# Patient Record
Sex: FEMALE | Race: WHITE | NOT HISPANIC OR LATINO | Employment: FULL TIME | URBAN - METROPOLITAN AREA
[De-identification: names, ages, dates, MRNs, and addresses within clinical notes are randomized per-mention and may not be internally consistent; named-entity substitution may affect disease eponyms.]

---

## 2020-07-10 ENCOUNTER — HOSPITAL ENCOUNTER (OUTPATIENT)
Facility: MEDICAL CENTER | Age: 45
End: 2020-07-10
Attending: PATHOLOGY
Payer: COMMERCIAL

## 2020-07-10 LAB — COVID ORDER STATUS COVID19: NORMAL

## 2020-07-10 PROCEDURE — U0003 INFECTIOUS AGENT DETECTION BY NUCLEIC ACID (DNA OR RNA); SEVERE ACUTE RESPIRATORY SYNDROME CORONAVIRUS 2 (SARS-COV-2) (CORONAVIRUS DISEASE [COVID-19]), AMPLIFIED PROBE TECHNIQUE, MAKING USE OF HIGH THROUGHPUT TECHNOLOGIES AS DESCRIBED BY CMS-2020-01-R: HCPCS

## 2020-07-12 LAB
SARS-COV-2 RNA RESP QL NAA+PROBE: NOTDETECTED
SPECIMEN SOURCE: NORMAL

## 2020-09-26 ENCOUNTER — OFFICE VISIT (OUTPATIENT)
Dept: URGENT CARE | Facility: CLINIC | Age: 45
End: 2020-09-26
Payer: COMMERCIAL

## 2020-09-26 VITALS
DIASTOLIC BLOOD PRESSURE: 60 MMHG | TEMPERATURE: 98.4 F | RESPIRATION RATE: 12 BRPM | HEIGHT: 65 IN | OXYGEN SATURATION: 97 % | SYSTOLIC BLOOD PRESSURE: 80 MMHG | BODY MASS INDEX: 18.33 KG/M2 | WEIGHT: 110 LBS | HEART RATE: 94 BPM

## 2020-09-26 DIAGNOSIS — A08.4 VIRAL GASTROENTERITIS: ICD-10-CM

## 2020-09-26 PROCEDURE — 99214 OFFICE O/P EST MOD 30 MIN: CPT | Performed by: FAMILY MEDICINE

## 2020-09-26 RX ORDER — PROMETHAZINE HYDROCHLORIDE 25 MG/1
25 TABLET ORAL EVERY 6 HOURS PRN
Qty: 20 TAB | Refills: 0 | Status: SHIPPED | OUTPATIENT
Start: 2020-09-26 | End: 2022-10-06

## 2020-09-26 ASSESSMENT — ENCOUNTER SYMPTOMS
FEVER: 0
HEADACHES: 1
NAUSEA: 1
VOMITING: 1
COUGH: 0
CHILLS: 0
SHORTNESS OF BREATH: 0
SORE THROAT: 0
DIARRHEA: 1
DIZZINESS: 1

## 2020-09-26 NOTE — PROGRESS NOTES
"Subjective:     Karis Gerardo is a 45 y.o. female who presents for Nausea (x1 week) and Diarrhea (x1 day)    HPI  Pt presents for evaluation of a new problem   Pt with nausea and vomiting x 1 week   Having diarrhea today   Having some abdominal soreness and back soreness from emesis  No constant abdominal pain  No bloody diarrhea or bloody emesis  Able to keep down fluids sometimes and small bites of white rice or bread  Emesis is several times a day and not improving  Has a slight headache today  No sore throat, fever, cough    Review of Systems   Constitutional: Negative for chills and fever.   HENT: Negative for congestion and sore throat.    Respiratory: Negative for cough and shortness of breath.    Cardiovascular: Negative for chest pain.   Gastrointestinal: Positive for diarrhea, nausea and vomiting.   Skin: Negative for rash.   Neurological: Positive for dizziness and headaches.       PMH: No history of recurrent emesis or stomach problems  MEDS:   Current Outpatient Medications:   •  Sertraline HCl (ZOLOFT PO), Take 45 mg by mouth every day., Disp: , Rfl:   ALLERGIES:   Allergies   Allergen Reactions   • Pcn [Penicillins]    • Sulfa Drugs      SURGHX:   Past Surgical History:   Procedure Laterality Date   • NASAL RECONSTRUCTION       SOCHX:  reports that she has never smoked. She does not have any smokeless tobacco history on file. She reports current alcohol use. She reports that she does not use drugs.  FH: Family history was reviewed, not contributing to acute illness     Objective:   BP (!) 80/60   Pulse 94   Temp 36.9 °C (98.4 °F) (Temporal)   Resp 12   Ht 1.651 m (5' 5\")   Wt 49.9 kg (110 lb)   LMP 09/10/2020 (Exact Date)   SpO2 97%   Breastfeeding No   BMI 18.30 kg/m²     Physical Exam  Constitutional:       General: She is not in acute distress.     Appearance: She is well-developed. She is not diaphoretic.   HENT:      Head: Normocephalic and atraumatic.   Pulmonary:      Effort: " Pulmonary effort is normal.   Abdominal:      General: Abdomen is flat. Bowel sounds are normal. There is no distension.      Palpations: Abdomen is soft.      Tenderness: There is no right CVA tenderness, left CVA tenderness, guarding or rebound.      Comments: +Slight tenderness in epigastric area    Skin:     General: Skin is warm and dry.      Findings: No erythema.   Neurological:      Mental Status: She is alert and oriented to person, place, and time.      Sensory: No sensory deficit.   Psychiatric:         Behavior: Behavior normal.         Thought Content: Thought content normal.         Judgment: Judgment normal.       Assessment/Plan:   Assessment    1. Viral gastroenteritis  - promethazine (PHENERGAN) 25 MG Tab; Take 1 Tab by mouth every 6 hours as needed for Nausea/Vomiting.  Dispense: 20 Tab; Refill: 0    Patient with viral gastroenteritis.  Low suspicion for pancreatitis, however did discuss possibility of alternative diagnoses.  Patient's blood pressure is a little low, however only getting slightly lightheaded when standing quickly.  Had a long discussion about risks and benefits of outpatient treatment versus being seen in the ER.  Do feel she would benefit from IV fluids.  Patient would like to try treatment on outpatient basis and will start Phenergan tabs.  Reviewed diet advancement and expected recovery course.  If patient is not able to start eating and drinking quite a bit more or if she becomes more dizzy, she will go to the emergency room tonight or tomorrow for consideration of IV fluids and for further work-up.  Patient is agreeable to the plan and will follow-up in the urgent care on an as-needed basis.

## 2020-10-21 ENCOUNTER — HOSPITAL ENCOUNTER (EMERGENCY)
Facility: MEDICAL CENTER | Age: 45
End: 2020-10-21
Attending: EMERGENCY MEDICINE
Payer: COMMERCIAL

## 2020-10-21 VITALS
WEIGHT: 104.6 LBS | OXYGEN SATURATION: 99 % | HEART RATE: 80 BPM | TEMPERATURE: 97.3 F | RESPIRATION RATE: 16 BRPM | HEIGHT: 65 IN | DIASTOLIC BLOOD PRESSURE: 83 MMHG | SYSTOLIC BLOOD PRESSURE: 122 MMHG | BODY MASS INDEX: 17.43 KG/M2

## 2020-10-21 DIAGNOSIS — R11.2 NON-INTRACTABLE VOMITING WITH NAUSEA, UNSPECIFIED VOMITING TYPE: ICD-10-CM

## 2020-10-21 DIAGNOSIS — R10.13 EPIGASTRIC ABDOMINAL PAIN: ICD-10-CM

## 2020-10-21 LAB
ALBUMIN SERPL BCP-MCNC: 4.7 G/DL (ref 3.2–4.9)
ALBUMIN/GLOB SERPL: 1.5 G/DL
ALP SERPL-CCNC: 62 U/L (ref 30–99)
ALT SERPL-CCNC: 12 U/L (ref 2–50)
ANION GAP SERPL CALC-SCNC: 12 MMOL/L (ref 7–16)
APPEARANCE UR: CLEAR
AST SERPL-CCNC: 18 U/L (ref 12–45)
BACTERIA #/AREA URNS HPF: ABNORMAL /HPF
BASOPHILS # BLD AUTO: 0.7 % (ref 0–1.8)
BASOPHILS # BLD: 0.05 K/UL (ref 0–0.12)
BILIRUB SERPL-MCNC: 0.9 MG/DL (ref 0.1–1.5)
BILIRUB UR QL STRIP.AUTO: NEGATIVE
BUN SERPL-MCNC: 7 MG/DL (ref 8–22)
CALCIUM SERPL-MCNC: 9.4 MG/DL (ref 8.4–10.2)
CHLORIDE SERPL-SCNC: 105 MMOL/L (ref 96–112)
CO2 SERPL-SCNC: 26 MMOL/L (ref 20–33)
COLOR UR: YELLOW
CREAT SERPL-MCNC: 0.75 MG/DL (ref 0.5–1.4)
EOSINOPHIL # BLD AUTO: 0.16 K/UL (ref 0–0.51)
EOSINOPHIL NFR BLD: 2.4 % (ref 0–6.9)
EPI CELLS #/AREA URNS HPF: ABNORMAL /HPF
ERYTHROCYTE [DISTWIDTH] IN BLOOD BY AUTOMATED COUNT: 39.1 FL (ref 35.9–50)
GLOBULIN SER CALC-MCNC: 3.1 G/DL (ref 1.9–3.5)
GLUCOSE SERPL-MCNC: 86 MG/DL (ref 65–99)
GLUCOSE UR STRIP.AUTO-MCNC: NEGATIVE MG/DL
HCG SERPL QL: NEGATIVE
HCT VFR BLD AUTO: 46.3 % (ref 37–47)
HGB BLD-MCNC: 15.6 G/DL (ref 12–16)
IMM GRANULOCYTES # BLD AUTO: 0.03 K/UL (ref 0–0.11)
IMM GRANULOCYTES NFR BLD AUTO: 0.4 % (ref 0–0.9)
KETONES UR STRIP.AUTO-MCNC: NEGATIVE MG/DL
LEUKOCYTE ESTERASE UR QL STRIP.AUTO: ABNORMAL
LIPASE SERPL-CCNC: 59 U/L (ref 7–58)
LYMPHOCYTES # BLD AUTO: 1.55 K/UL (ref 1–4.8)
LYMPHOCYTES NFR BLD: 23.2 % (ref 22–41)
MCH RBC QN AUTO: 30.9 PG (ref 27–33)
MCHC RBC AUTO-ENTMCNC: 33.7 G/DL (ref 33.6–35)
MCV RBC AUTO: 91.7 FL (ref 81.4–97.8)
MICRO URNS: ABNORMAL
MONOCYTES # BLD AUTO: 0.61 K/UL (ref 0–0.85)
MONOCYTES NFR BLD AUTO: 9.1 % (ref 0–13.4)
NEUTROPHILS # BLD AUTO: 4.27 K/UL (ref 2–7.15)
NEUTROPHILS NFR BLD: 64.2 % (ref 44–72)
NITRITE UR QL STRIP.AUTO: NEGATIVE
NRBC # BLD AUTO: 0 K/UL
NRBC BLD-RTO: 0 /100 WBC
PH UR STRIP.AUTO: 6.5 [PH] (ref 5–8)
PLATELET # BLD AUTO: 250 K/UL (ref 164–446)
PMV BLD AUTO: 9.3 FL (ref 9–12.9)
POTASSIUM SERPL-SCNC: 3.6 MMOL/L (ref 3.6–5.5)
PROT SERPL-MCNC: 7.8 G/DL (ref 6–8.2)
PROT UR QL STRIP: NEGATIVE MG/DL
RBC # BLD AUTO: 5.05 M/UL (ref 4.2–5.4)
RBC # URNS HPF: ABNORMAL /HPF
RBC UR QL AUTO: ABNORMAL
SODIUM SERPL-SCNC: 143 MMOL/L (ref 135–145)
SP GR UR STRIP.AUTO: <=1.005
WBC # BLD AUTO: 6.7 K/UL (ref 4.8–10.8)
WBC #/AREA URNS HPF: ABNORMAL /HPF

## 2020-10-21 PROCEDURE — 85025 COMPLETE CBC W/AUTO DIFF WBC: CPT

## 2020-10-21 PROCEDURE — 96374 THER/PROPH/DIAG INJ IV PUSH: CPT

## 2020-10-21 PROCEDURE — 700105 HCHG RX REV CODE 258: Performed by: EMERGENCY MEDICINE

## 2020-10-21 PROCEDURE — 84703 CHORIONIC GONADOTROPIN ASSAY: CPT

## 2020-10-21 PROCEDURE — 81001 URINALYSIS AUTO W/SCOPE: CPT

## 2020-10-21 PROCEDURE — 80053 COMPREHEN METABOLIC PANEL: CPT

## 2020-10-21 PROCEDURE — 99284 EMERGENCY DEPT VISIT MOD MDM: CPT

## 2020-10-21 PROCEDURE — 83690 ASSAY OF LIPASE: CPT

## 2020-10-21 PROCEDURE — 700111 HCHG RX REV CODE 636 W/ 250 OVERRIDE (IP): Performed by: EMERGENCY MEDICINE

## 2020-10-21 PROCEDURE — 96375 TX/PRO/DX INJ NEW DRUG ADDON: CPT

## 2020-10-21 RX ORDER — MECLIZINE HYDROCHLORIDE 25 MG/1
25 TABLET ORAL EVERY 6 HOURS PRN
Qty: 20 TAB | Refills: 0 | Status: SHIPPED | OUTPATIENT
Start: 2020-10-21 | End: 2022-10-06

## 2020-10-21 RX ORDER — ONDANSETRON 8 MG/1
8 TABLET, ORALLY DISINTEGRATING ORAL EVERY 12 HOURS PRN
Status: SHIPPED | COMMUNITY
End: 2022-10-06

## 2020-10-21 RX ORDER — OMEPRAZOLE 40 MG/1
40 CAPSULE, DELAYED RELEASE ORAL DAILY
Qty: 30 CAP | Refills: 0 | Status: SHIPPED | OUTPATIENT
Start: 2020-10-21 | End: 2022-10-06

## 2020-10-21 RX ORDER — PROCHLORPERAZINE EDISYLATE 5 MG/ML
10 INJECTION INTRAMUSCULAR; INTRAVENOUS ONCE
Status: COMPLETED | OUTPATIENT
Start: 2020-10-21 | End: 2020-10-21

## 2020-10-21 RX ORDER — LORAZEPAM 2 MG/ML
1 INJECTION INTRAMUSCULAR ONCE
Status: COMPLETED | OUTPATIENT
Start: 2020-10-21 | End: 2020-10-21

## 2020-10-21 RX ORDER — SODIUM CHLORIDE, SODIUM LACTATE, POTASSIUM CHLORIDE, CALCIUM CHLORIDE 600; 310; 30; 20 MG/100ML; MG/100ML; MG/100ML; MG/100ML
1000 INJECTION, SOLUTION INTRAVENOUS ONCE
Status: COMPLETED | OUTPATIENT
Start: 2020-10-21 | End: 2020-10-21

## 2020-10-21 RX ORDER — LORAZEPAM 0.5 MG/1
0.5 TABLET ORAL EVERY 8 HOURS PRN
Qty: 15 TAB | Refills: 0 | Status: SHIPPED | OUTPATIENT
Start: 2020-10-21 | End: 2020-10-26

## 2020-10-21 RX ORDER — OMEPRAZOLE 40 MG/1
40 CAPSULE, DELAYED RELEASE ORAL DAILY
Qty: 30 CAP | Refills: 0 | Status: SHIPPED | OUTPATIENT
Start: 2020-10-21 | End: 2020-10-21 | Stop reason: SDUPTHER

## 2020-10-21 RX ADMIN — LORAZEPAM 1 MG: 2 INJECTION INTRAMUSCULAR; INTRAVENOUS at 09:03

## 2020-10-21 RX ADMIN — PROCHLORPERAZINE EDISYLATE 10 MG: 5 INJECTION INTRAMUSCULAR; INTRAVENOUS at 06:56

## 2020-10-21 RX ADMIN — SODIUM CHLORIDE, POTASSIUM CHLORIDE, SODIUM LACTATE AND CALCIUM CHLORIDE 1000 ML: 600; 310; 30; 20 INJECTION, SOLUTION INTRAVENOUS at 06:56

## 2020-10-21 NOTE — DISCHARGE INSTRUCTIONS
Gastritis    Avoid all ibuprofen and naproxen, aspirin and alcohol.  Take Prilosec 40 mg a day for 4 weeks.  Return for worsening pain, bleeding or pain and fever.  Followup with your doctor or GI if not better in 3-4 days.   Take maalox and Tylenol if needed until acid blocker takes effect.    You had a borderline or high normal blood pressure reading today.  This does not necessarily mean you have hypertension.  Please followup with your/a primary physician for comprehensive blood pressure evaluation and yearly fasting cholesterol assessment.  BP Readings from Last 3 Encounters:   10/21/20 103/65   09/26/20 (!) 80/60   09/20/16 100/65         You have gastritis. Gastritis is an irritation of the stomach. This is often caused by medications, but can be from anything that bothers the stomach.   Other stomach irritants are:  Alcohol.  Caffeine.  Nicotine.  Spicy or acid foods.     Medications for pain and arthritis. Aspirin and other anti-inflammatory medicines such as ibuprofen (Advil), naproxen (Aleve), and ketoprofen (Orudis) can be highly irritating.  Emotional distress.     Symptoms of gastritis may include:  Abdominal pain. Indigestion. Nausea and or vomiting. Bleeding.      Some patients with chronic gastritis and ulcers have been infected by a germ. They may need special testing. Medications which kill germs can be used to cure this condition.    Treatment includes avoiding the substances mentioned above that are known to cause stomach trouble.    Medications used to treat gastritis can include:  Antacids.  Medicines to control vomiting.   Acid blocking medicines (Axid, Pepcid, Prevacid, Prilosec, Tagamet, Zantac).     Symptoms of gastritis usually improve within 2-3 days of starting treatment. Call your caregiver if you are not better in a few days.    SEEK MEDICAL CARE IF YOU:    Have increased stomach pain or chest pain.  Vomit blood.  Faint or feel light headed. Can not keep fluids down.  Pass bloody or  black stools.  Develop severe back pain.     Document Released: 12/18/2006  Document Re-Released: 06/30/2008  [employer]® Patient Information ©2008 [employer], [employer].

## 2020-10-21 NOTE — ED TRIAGE NOTES
Pt reports epigastric abdominal pain x 1 month. States pain was worse last night and was unable to keep anything down. Pt has been seen by GI - has had barium x-ray and ultrasound done, and nothing was found. Unable to get in for endoscopy for 2 months.

## 2020-10-21 NOTE — ED PROVIDER NOTES
ED Provider Note    CHIEF COMPLAINT  Chief Complaint   Patient presents with   • Abdominal Pain       HPI  Karis Gerardo is a 45 y.o. female who presents with uncontrolled vomiting yesterday associated with mild epigastric pain.  Patient's had 6 weeks of nausea and vomiting variable from day to day associated with epigastric pain.  She has chronic baseline diarrhea.  She has had labs supposedly at [facility] and another lab at [facility].  She has had an ultrasound at [facility] that was normal.  She is seeing Dr. Elias AGUILAR and has endoscopy pending in December.  Denies any proton pump inhibitor or sucralfate use.  She has been treated with an antiemetic.  Ondansetron has not been of benefit to her.  No history of gastritis or ulcer.  Denies NSAID use.  She does drink 1-1-1/2 alcoholic beverages daily but stopped 6 weeks ago when her symptoms developed.  No abdominal surgeries diverticulitis pregnancy ovarian cyst endometriosis or pelvic infections.  She has had an upper GI that may have shown esophagitis.    REVIEW OF SYSTEMS  Pertinent positives include: Epigastric abdominal pain, nausea, vomiting, chronic diarrhea.  Pertinent negatives include: Fever, cough, coronavirus exposure, dysuria, urgency, frequency, hematuria, kidney stones.  10+ systems reviewed and negative.      PAST MEDICAL HISTORY  Denies    FAMILY HISTORY  Family History   Problem Relation Age of Onset   • Hypertension Father    • Hyperlipidemia Sister        SOCIAL HISTORY  Social History     Tobacco Use   • Smoking status: Never Smoker   Substance Use Topics   • Alcohol use: Yes   • Drug use: No     Social History     Substance and Sexual Activity   Drug Use No       SURGICAL HISTORY  Past Surgical History:   Procedure Laterality Date   • NASAL RECONSTRUCTION         CURRENT MEDICATIONS    Current Outpatient Medications   Medication Sig Dispense Refill   • promethazine (PHENERGAN) 25 MG Tab Take 1 Tab by mouth every 6 hours as needed  "for Nausea/Vomiting. 20 Tab 0   • Sertraline HCl (ZOLOFT PO) Take 45 mg by mouth every day.         ALLERGIES  Allergies   Allergen Reactions   • Pcn [Penicillins]    • Sulfa Drugs        PHYSICAL EXAM  VITAL SIGNS: /81   Pulse 84   Temp 36.3 °C (97.3 °F) (Temporal)   Resp 19   Ht 1.651 m (5' 5\")   Wt 47.4 kg (104 lb 9.6 oz)   SpO2 92%   BMI 17.41 kg/m²   Reviewed and afebrile  Constitutional: Well developed, Well nourished, well-appearing thin.  HENT: Normocephalic, atraumatic, bilateral external ears normal, Wearing mask.   Eyes: PERRLA, conjunctiva pink, no scleral icterus.   Cardiovascular: Regular S1-S2 without murmur, rub, gallop.  No dependent edema or calf tenderness.  Respiratory: No rales, rhonchi, wheeze or cough.  Gastrointestinal: Soft, minimal epigastric tenderness, nondistended, no organomegaly.  No masses, bowel sounds normal  Skin: No erythema, no rash.   Genitourinary:  No costovertebral angle tenderness.   Neurologic: Alert & oriented x 3, cranial nerves 2-12 intact by passive exam.  No focal deficit noted.  Psychiatric: Affect normal, Judgment normal, Mood normal.     DIFFERENTIAL DIAGNOSIS:  Gastritis, peptic ulcer disease, doubt perforation pancreatitis a calculus cholecystitis doubt splenic infarct doubt colitis.    RADIOLOGY/PROCEDURES  Ultrasound report reviewed and normal of right upper quadrant    LABORATORY:  Results for orders placed or performed during the hospital encounter of 10/21/20   CBC WITH DIFFERENTIAL   Result Value Ref Range    WBC 6.7 4.8 - 10.8 K/uL    RBC 5.05 4.20 - 5.40 M/uL    Hemoglobin 15.6 12.0 - 16.0 g/dL    Hematocrit 46.3 37.0 - 47.0 %    MCV 91.7 81.4 - 97.8 fL    MCH 30.9 27.0 - 33.0 pg    MCHC 33.7 33.6 - 35.0 g/dL    RDW 39.1 35.9 - 50.0 fL    Platelet Count 250 164 - 446 K/uL    MPV 9.3 9.0 - 12.9 fL    Neutrophils-Polys 64.20 44.00 - 72.00 %    Lymphocytes 23.20 22.00 - 41.00 %    Monocytes 9.10 0.00 - 13.40 %    Eosinophils 2.40 0.00 - 6.90 %    " Basophils 0.70 0.00 - 1.80 %    Immature Granulocytes 0.40 0.00 - 0.90 %    Nucleated RBC 0.00 /100 WBC    Neutrophils (Absolute) 4.27 2.00 - 7.15 K/uL    Lymphs (Absolute) 1.55 1.00 - 4.80 K/uL    Monos (Absolute) 0.61 0.00 - 0.85 K/uL    Eos (Absolute) 0.16 0.00 - 0.51 K/uL    Baso (Absolute) 0.05 0.00 - 0.12 K/uL    Immature Granulocytes (abs) 0.03 0.00 - 0.11 K/uL    NRBC (Absolute) 0.00 K/uL   Comp Metabolic Panel   Result Value Ref Range    Sodium 143 135 - 145 mmol/L    Potassium 3.6 3.6 - 5.5 mmol/L    Chloride 105 96 - 112 mmol/L    Co2 26 20 - 33 mmol/L    Anion Gap 12.0 7.0 - 16.0    Glucose 86 65 - 99 mg/dL    Bun 7 (L) 8 - 22 mg/dL    Creatinine 0.75 0.50 - 1.40 mg/dL    Calcium 9.4 8.4 - 10.2 mg/dL    AST(SGOT) 18 12 - 45 U/L    ALT(SGPT) 12 2 - 50 U/L    Alkaline Phosphatase 62 30 - 99 U/L    Total Bilirubin 0.9 0.1 - 1.5 mg/dL    Albumin 4.7 3.2 - 4.9 g/dL    Total Protein 7.8 6.0 - 8.2 g/dL    Globulin 3.1 1.9 - 3.5 g/dL    A-G Ratio 1.5 g/dL   LIPASE   Result Value Ref Range    Lipase 59 (H) 7 - 58 U/L   URINALYSIS    Specimen: Blood   Result Value Ref Range    Color Yellow     Character Clear     Specific Gravity <=1.005 <1.035    Ph 6.5 5.0 - 8.0    Glucose Negative Negative mg/dL    Ketones Negative Negative mg/dL    Protein Negative Negative mg/dL    Bilirubin Negative Negative    Nitrite Negative Negative    Leukocyte Esterase Trace (A) Negative    Occult Blood Trace (A) Negative    Micro Urine Req Microscopic    HCG QUAL SERUM   Result Value Ref Range    Beta-Hcg Qualitative Serum Negative Negative   URINE MICROSCOPIC (W/UA)   Result Value Ref Range    WBC 5-10 (A) /hpf    RBC 2-5 (A) /hpf    Bacteria Few (A) None /hpf    Epithelial Cells Moderate (A) Few /hpf       INTERVENTIONS: Indication IV fluids abdominal pain and uncontrolled vomiting  Medications   lactated ringers infusion (BOLUS) (0 mL Intravenous Stopped 10/21/20 3686)   prochlorperazine (COMPAZINE) injection 10 mg (10 mg  Intravenous Given 10/21/20 0656)   LORazepam (ATIVAN) injection 1 mg (1 mg Intravenous Given 10/21/20 0903)     Response: Tolerated p.o. trial.  Improvement in nausea with lorazepam.  Improved hydration.    COURSE & MEDICAL DECISION MAKING  Appearing patient presents with epigastric abdominal pain and vomiting for 6 weeks.  Its likely the pain is dyspeptic either gastritis or peptic ulcer disease although there is no evidence of perforation.  Ultrasound was negative for cholelithiasis and cholecystitis.  There is no pancreatitis.  Symptoms are unlike colitis or splenic infarct.  For some reason the patient has not yet been placed on proton pump inhibitors..    This patient has borderline or elevated blood pressure as recorded above and was instructed to followup with primary physician for comprehensive blood pressure evaluation and yearly fasting cholesterol assessment according to to CMS protocol.    PLAN:  New Prescriptions    LORAZEPAM (ATIVAN) 0.5 MG TAB    Take 1 Tab by mouth every 8 hours as needed for Anxiety (or insomnia) for up to 5 days.    MECLIZINE (ANTIVERT) 25 MG TAB    Take 1 Tab by mouth every 6 hours as needed for Dizziness, Nausea/Vomiting or Vertigo.    OMEPRAZOLE (PRILOSEC) 40 MG DELAYED-RELEASE CAPSULE    Take 1 Cap by mouth every day.     Avoid alcohol and NSAIDs  Tylenol and Maalox for pain  Gastritis handout given  Return for severe pain, uncontrolled vomiting, GI bleed, dizziness, ill appearance    Leon Griffin M.D.  [address] [place] [id number]  [phone]    Schedule an appointment as soon as possible for a visit   or endoscopy as planned      CONDITION: Stable, improved.    FINAL IMPRESSION  1. Epigastric abdominal pain    2. Non-intractable vomiting with nausea, unspecified vomiting type          Electronically signed by: Kurtis Gr M.D., 10/21/2020 6:32 AM

## 2020-11-10 ENCOUNTER — HOSPITAL ENCOUNTER (OUTPATIENT)
Dept: RADIOLOGY | Facility: MEDICAL CENTER | Age: 45
End: 2020-11-10
Attending: INTERNAL MEDICINE
Payer: COMMERCIAL

## 2020-11-10 DIAGNOSIS — R11.2 NAUSEA AND VOMITING, INTRACTABILITY OF VOMITING NOT SPECIFIED, UNSPECIFIED VOMITING TYPE: ICD-10-CM

## 2020-11-10 DIAGNOSIS — R10.13 ABDOMINAL PAIN, EPIGASTRIC: ICD-10-CM

## 2020-11-10 DIAGNOSIS — R63.4 LOSS OF WEIGHT: ICD-10-CM

## 2020-11-10 PROCEDURE — 74018 RADEX ABDOMEN 1 VIEW: CPT

## 2022-03-22 ENCOUNTER — HOSPITAL ENCOUNTER (OUTPATIENT)
Dept: CARDIOLOGY | Facility: MEDICAL CENTER | Age: 47
End: 2022-03-22
Attending: FAMILY MEDICINE
Payer: COMMERCIAL

## 2022-03-22 DIAGNOSIS — R07.2 PRECORDIAL PAIN: ICD-10-CM

## 2022-03-22 LAB — LV EJECT FRACT  99904: 65

## 2022-03-22 PROCEDURE — 93018 CV STRESS TEST I&R ONLY: CPT | Performed by: INTERNAL MEDICINE

## 2022-03-22 PROCEDURE — 93350 STRESS TTE ONLY: CPT | Mod: 26 | Performed by: INTERNAL MEDICINE

## 2022-03-22 PROCEDURE — 93017 CV STRESS TEST TRACING ONLY: CPT

## 2022-09-27 ENCOUNTER — APPOINTMENT (OUTPATIENT)
Dept: RADIOLOGY | Facility: MEDICAL CENTER | Age: 47
End: 2022-09-27
Attending: EMERGENCY MEDICINE
Payer: COMMERCIAL

## 2022-09-27 ENCOUNTER — APPOINTMENT (OUTPATIENT)
Dept: URGENT CARE | Facility: CLINIC | Age: 47
End: 2022-09-27
Payer: COMMERCIAL

## 2022-09-27 ENCOUNTER — HOSPITAL ENCOUNTER (EMERGENCY)
Facility: MEDICAL CENTER | Age: 47
End: 2022-09-27
Attending: EMERGENCY MEDICINE
Payer: COMMERCIAL

## 2022-09-27 VITALS
OXYGEN SATURATION: 95 % | HEIGHT: 65 IN | TEMPERATURE: 98.5 F | DIASTOLIC BLOOD PRESSURE: 82 MMHG | HEART RATE: 83 BPM | BODY MASS INDEX: 18.22 KG/M2 | RESPIRATION RATE: 16 BRPM | SYSTOLIC BLOOD PRESSURE: 123 MMHG | WEIGHT: 109.35 LBS

## 2022-09-27 DIAGNOSIS — I82.492 ACUTE DEEP VEIN THROMBOSIS (DVT) OF OTHER SPECIFIED VEIN OF LEFT LOWER EXTREMITY (HCC): ICD-10-CM

## 2022-09-27 LAB
ALBUMIN SERPL BCP-MCNC: 4.2 G/DL (ref 3.2–4.9)
ALP SERPL-CCNC: 56 U/L (ref 30–99)
ALT SERPL-CCNC: 15 U/L (ref 2–50)
ANION GAP SERPL CALC-SCNC: 11 MMOL/L (ref 7–16)
APTT PPP: 23.4 SEC (ref 24.7–36)
AST SERPL-CCNC: 22 U/L (ref 12–45)
BASOPHILS # BLD AUTO: 0.5 % (ref 0–1.8)
BASOPHILS # BLD: 0.05 K/UL (ref 0–0.12)
BILIRUB CONJ SERPL-MCNC: <0.2 MG/DL (ref 0.1–0.5)
BILIRUB INDIRECT SERPL-MCNC: NORMAL MG/DL (ref 0–1)
BILIRUB SERPL-MCNC: 0.6 MG/DL (ref 0.1–1.5)
BUN SERPL-MCNC: 12 MG/DL (ref 8–22)
CALCIUM SERPL-MCNC: 9.2 MG/DL (ref 8.4–10.2)
CHLORIDE SERPL-SCNC: 103 MMOL/L (ref 96–112)
CO2 SERPL-SCNC: 23 MMOL/L (ref 20–33)
CREAT SERPL-MCNC: 0.67 MG/DL (ref 0.5–1.4)
EOSINOPHIL # BLD AUTO: 0.44 K/UL (ref 0–0.51)
EOSINOPHIL NFR BLD: 4.7 % (ref 0–6.9)
ERYTHROCYTE [DISTWIDTH] IN BLOOD BY AUTOMATED COUNT: 41.6 FL (ref 35.9–50)
GFR SERPLBLD CREATININE-BSD FMLA CKD-EPI: 110 ML/MIN/1.73 M 2
GLUCOSE SERPL-MCNC: 93 MG/DL (ref 65–99)
HCG SERPL QL: NEGATIVE
HCT VFR BLD AUTO: 44.9 % (ref 37–47)
HGB BLD-MCNC: 15 G/DL (ref 12–16)
IMM GRANULOCYTES # BLD AUTO: 0.02 K/UL (ref 0–0.11)
IMM GRANULOCYTES NFR BLD AUTO: 0.2 % (ref 0–0.9)
INR PPP: 0.96 (ref 0.87–1.13)
LYMPHOCYTES # BLD AUTO: 1.99 K/UL (ref 1–4.8)
LYMPHOCYTES NFR BLD: 21.1 % (ref 22–41)
MCH RBC QN AUTO: 31 PG (ref 27–33)
MCHC RBC AUTO-ENTMCNC: 33.4 G/DL (ref 33.6–35)
MCV RBC AUTO: 92.8 FL (ref 81.4–97.8)
MONOCYTES # BLD AUTO: 0.64 K/UL (ref 0–0.85)
MONOCYTES NFR BLD AUTO: 6.8 % (ref 0–13.4)
NEUTROPHILS # BLD AUTO: 6.3 K/UL (ref 2–7.15)
NEUTROPHILS NFR BLD: 66.7 % (ref 44–72)
NRBC # BLD AUTO: 0 K/UL
NRBC BLD-RTO: 0 /100 WBC
PLATELET # BLD AUTO: 274 K/UL (ref 164–446)
PMV BLD AUTO: 9.1 FL (ref 9–12.9)
POTASSIUM SERPL-SCNC: 4.2 MMOL/L (ref 3.6–5.5)
PROT SERPL-MCNC: 7.5 G/DL (ref 6–8.2)
PROTHROMBIN TIME: 12.4 SEC (ref 12–14.6)
RBC # BLD AUTO: 4.84 M/UL (ref 4.2–5.4)
SODIUM SERPL-SCNC: 137 MMOL/L (ref 135–145)
TSH SERPL DL<=0.005 MIU/L-ACNC: 1.57 UIU/ML (ref 0.38–5.33)
WBC # BLD AUTO: 9.4 K/UL (ref 4.8–10.8)

## 2022-09-27 PROCEDURE — 36415 COLL VENOUS BLD VENIPUNCTURE: CPT

## 2022-09-27 PROCEDURE — 85025 COMPLETE CBC W/AUTO DIFF WBC: CPT

## 2022-09-27 PROCEDURE — 70496 CT ANGIOGRAPHY HEAD: CPT

## 2022-09-27 PROCEDURE — 80048 BASIC METABOLIC PNL TOTAL CA: CPT

## 2022-09-27 PROCEDURE — 80076 HEPATIC FUNCTION PANEL: CPT

## 2022-09-27 PROCEDURE — 85730 THROMBOPLASTIN TIME PARTIAL: CPT

## 2022-09-27 PROCEDURE — A9270 NON-COVERED ITEM OR SERVICE: HCPCS | Performed by: EMERGENCY MEDICINE

## 2022-09-27 PROCEDURE — 85610 PROTHROMBIN TIME: CPT

## 2022-09-27 PROCEDURE — 700117 HCHG RX CONTRAST REV CODE 255: Performed by: EMERGENCY MEDICINE

## 2022-09-27 PROCEDURE — 84443 ASSAY THYROID STIM HORMONE: CPT

## 2022-09-27 PROCEDURE — 93971 EXTREMITY STUDY: CPT | Mod: LT

## 2022-09-27 PROCEDURE — 700102 HCHG RX REV CODE 250 W/ 637 OVERRIDE(OP): Performed by: EMERGENCY MEDICINE

## 2022-09-27 PROCEDURE — 70498 CT ANGIOGRAPHY NECK: CPT

## 2022-09-27 PROCEDURE — 99283 EMERGENCY DEPT VISIT LOW MDM: CPT

## 2022-09-27 PROCEDURE — 84703 CHORIONIC GONADOTROPIN ASSAY: CPT

## 2022-09-27 RX ADMIN — IOHEXOL 89 ML: 350 INJECTION, SOLUTION INTRAVENOUS at 22:41

## 2022-09-27 RX ADMIN — RIVAROXABAN 15 MG: 15 TABLET, FILM COATED ORAL at 23:02

## 2022-09-27 ASSESSMENT — FIBROSIS 4 INDEX: FIB4 SCORE: 0.94

## 2022-09-28 ENCOUNTER — TELEPHONE (OUTPATIENT)
Dept: VASCULAR LAB | Facility: MEDICAL CENTER | Age: 47
End: 2022-09-28
Payer: COMMERCIAL

## 2022-09-28 NOTE — TELEPHONE ENCOUNTER
[facility]    Received anticoagulation referral for Xarelto due to DVT from Dr. Bruce on 9/27/22    Scheduled NP for 10/6    Insurance: UMR  PCP: non [facility]  Locations to be seen: [address]    [facility] at [phone], fax [phone]    Linh Gasca, PharmD

## 2022-09-28 NOTE — DISCHARGE PLANNING
note:  Received a call from pt who said that she tried to  her prescription but was told by the pharmacist that she needs a prior auth.     CM talked to Meg , pharmacist at [facility] and she confirmed that this needs   A prior auth.     SARA gave   BIN [id number]  PCN [id number]  Group [id number]  And this coupon numbers worked and will give pt Xarelto 30 day free trial.   Meg at [facility] pharmacy confirmed this.   Pt notified.     PA started with covermymeds.

## 2022-09-28 NOTE — ED NOTES
New orders obtained and pending an IV placement.  Patient report of pain at 6/10 left lower leg declined  pain medication at this time

## 2022-09-28 NOTE — ED PROVIDER NOTES
"ED Provider Note    CHIEF COMPLAINT  Chief Complaint   Patient presents with    Leg Swelling     LLE Onset Sunday  Associated with achyness Denies trauma    Numbness     Patches LT face and low back  Onset last night  Some vague visual spots and flashes of light  Denies speech or swallowing diff  Denies ext. Weakness  or balance disturbances  No H/A       HPI  Karis Gerardo is a 47 y.o. female who presents with left lower extremity swelling and discomfort in the ankle region since Sunday.  Denies any trauma.  She notes that she did go on a hike on Saturday and was wearing boots at the time.  Denies any trauma.  No history of blood clots/DVT though does have a history of chronic OCP use.    She also notes intermittent patches of numbness to her left face and left flank.  No difficulty with speech.  No headache.  No weakness.  No difficulty with balance or walking.    No recent fevers or illness.  No recent prolonged travel or surgery.  No prior history of stroke.  No change in vision    REVIEW OF SYSTEMS  See HPI for further details. All other systems are negative.     PAST MEDICAL HISTORY   has a past medical history of Patient denies medical problems.    SOCIAL HISTORY  Social History     Tobacco Use    Smoking status: Never    Smokeless tobacco: Never   Vaping Use    Vaping Use: Never used   Substance and Sexual Activity    Alcohol use: Yes    Drug use: No    Sexual activity: Not on file       SURGICAL HISTORY   has a past surgical history that includes nasal reconstruction.    CURRENT MEDICATIONS  Home Medications    **Home medications have not yet been reviewed for this encounter**         ALLERGIES  Allergies   Allergen Reactions    Pcn [Penicillins] Hives     RXN=as a child    Sulfa Drugs Hives     RXN=as a child       PHYSICAL EXAM  VITAL SIGNS: /42   Pulse 89   Temp 36.7 °C (98 °F) (Temporal)   Resp 16   Ht 1.651 m (5' 5\")   Wt 49.6 kg (109 lb 5.6 oz)   LMP 09/10/2020 (Exact Date)   SpO2 96% "   BMI 18.20 kg/m²   Pulse ox interpretation: I interpret this pulse ox as normal.  Constitutional: Alert in no apparent distress.  HENT: No signs of trauma, Bilateral external ears normal, Nose normal.   Eyes: Pupils are equal and reactive, Conjunctiva normal, Non-icteric.   Neck: Normal range of motion, Supple, No stridor.   Cardiovascular: Regular rate and rhythm.   Thorax & Lungs: Normal breath sounds, No respiratory distress  Skin: Warm, Dry, No erythema, No rash.   Extremities: Intact distal pulses, very faint edema, left calf tenderness, No cyanosis  Musculoskeletal: Good range of motion in all major joints. No tenderness to palpation or major deformities noted.   Neurologic: Alert, Normal motor function, slight numbness to the left face, no facial droop or weakness      DIAGNOSTIC STUDIES / PROCEDURES    EKG - Physician interpretation  No results found for this or any previous visit.    LABS  Labs Reviewed   CBC WITH DIFFERENTIAL - Abnormal; Notable for the following components:       Result Value    MCHC 33.4 (*)     Lymphocytes 21.10 (*)     All other components within normal limits   APTT - Abnormal; Notable for the following components:    APTT 23.4 (*)     All other components within normal limits   BASIC METABOLIC PANEL   HCG QUAL SERUM   TSH WITH REFLEX TO FT4   PROTHROMBIN TIME   ESTIMATED GFR   HEPATIC FUNCTION PANEL       RADIOLOGY  CT-CTA HEAD WITH & W/O-POST PROCESS   Final Result         1.  No large vessel occlusion or aneurysm identified      CT-CTA NECK WITH & W/O-POST PROCESSING   Final Result         1.  CT angiogram of the neck within normal limits.         US-EXTREMITY VENOUS LOWER UNILAT LEFT   Final Result      1.  Acute thrombosis which is not completely occlusive is identified in deep and superficial veins of the left lower extremity.      2.  These findings were discussed with [facility] on 09/27/2022.          COURSE & MEDICAL DECISION MAKING    Medications   iohexol (OMNIPAQUE) 350  mg/mL (IV) (89 mL Intravenous Given 9/27/22 7820)   rivaroxaban (XARELTO) tablet 15 mg (15 mg Oral Given 9/27/22 2304)       Pertinent Labs & Imaging studies reviewed. (See chart for details)  47 y.o. female presenting with left lower extremity swelling since Sunday.  Has some slight soreness to the left lower extremity.  No erythema.  No history of trauma.  Has very faint edema.  No prior history of DVT.  No significant risk factors for DVT other than chronic oral contraceptive use.  Denies smoking history.    No chest pain or trouble breathing.  She does have slight intermittent numbness to the left face and some patchy numbness to the her left flank as well.  No weakness to her upper or lower extremities.  No facial droop.  No difficulty with cognition or speech.  No visual change.    Laboratory studies were performed which were largely unremarkable.  No leukocytosis.  No anemia.  No significant metabolic abnormalities.    DVT ultrasound study was performed of the left lower extremity and she was found to have nonocclusive DVT.  She was started on oral anticoagulation, Xarelto.  Given her vague neurologic concerns as well, CTA of the head and neck were performed to rule out any occlusive process within the brain causing her numbness symptoms.  Fortunately, these were found to be reassuring without any evidence of vascular abnormality or acute thrombus in the large vessel.    Patient was started on anticoagulation, on Xarelto acute.  Given a first dose here and discharged with a prescription.  She was given bleeding precautions and discharged in stable condition.  Recommending discussion with her GYN regarding continuing hormone therapy.  In the meantime, recommending that she stop this medication.  Recommending follow-up with anticoagulation clinic as well to monitor the patient's use of Xarelto.    The patient was instructed to follow-up with primary care physician for further management.  To return immediately  "for any worsening symptoms or development of any other concerning signs or symptoms. The patient verbalizes understanding in their own words.    /82   Pulse 83   Temp 36.9 °C (98.5 °F) (Temporal)   Resp 16   Ht 1.651 m (5' 5\")   Wt 49.6 kg (109 lb 5.6 oz)   LMP 09/10/2020 (Exact Date)   SpO2 95%   BMI 18.20 kg/m²     The patient was referred to primary care where they will receive further BP management.      [facility], Emergency Dept  [address]  [place] [address]  [phone]    As needed, If symptoms worsen    Primary care doctor    Schedule an appointment as soon as possible for a visit       FINAL IMPRESSION  1. Acute deep vein thrombosis (DVT) of other specified vein of left lower extremity (HCC)            Electronically signed by: Nacho Bruce M.D., 9/27/2022 6:36 PM    "

## 2022-09-30 ENCOUNTER — APPOINTMENT (OUTPATIENT)
Dept: RADIOLOGY | Facility: MEDICAL CENTER | Age: 47
End: 2022-09-30
Attending: FAMILY MEDICINE
Payer: COMMERCIAL

## 2022-09-30 ENCOUNTER — HOSPITAL ENCOUNTER (OUTPATIENT)
Facility: MEDICAL CENTER | Age: 47
End: 2022-09-30
Attending: OBSTETRICS & GYNECOLOGY
Payer: COMMERCIAL

## 2022-09-30 DIAGNOSIS — Z12.39 SCREENING BREAST EXAMINATION: ICD-10-CM

## 2022-09-30 PROCEDURE — 88175 CYTOPATH C/V AUTO FLUID REDO: CPT

## 2022-09-30 PROCEDURE — 87624 HPV HI-RISK TYP POOLED RSLT: CPT

## 2022-09-30 PROCEDURE — 77067 SCR MAMMO BI INCL CAD: CPT

## 2022-10-03 LAB
CYTOLOGY REG CYTOL: NORMAL
HPV HR 12 DNA CVX QL NAA+PROBE: NEGATIVE
HPV16 DNA SPEC QL NAA+PROBE: NEGATIVE
HPV18 DNA SPEC QL NAA+PROBE: NEGATIVE
SPECIMEN SOURCE: NORMAL

## 2022-10-06 ENCOUNTER — DOCUMENTATION (OUTPATIENT)
Dept: VASCULAR LAB | Facility: MEDICAL CENTER | Age: 47
End: 2022-10-06

## 2022-10-06 ENCOUNTER — ANTICOAGULATION VISIT (OUTPATIENT)
Dept: VASCULAR LAB | Facility: MEDICAL CENTER | Age: 47
End: 2022-10-06
Attending: INTERNAL MEDICINE
Payer: COMMERCIAL

## 2022-10-06 VITALS — HEART RATE: 81 BPM | SYSTOLIC BLOOD PRESSURE: 118 MMHG | DIASTOLIC BLOOD PRESSURE: 81 MMHG

## 2022-10-06 DIAGNOSIS — Z79.01 CHRONIC ANTICOAGULATION: ICD-10-CM

## 2022-10-06 DIAGNOSIS — I82.412 ACUTE DEEP VEIN THROMBOSIS (DVT) OF FEMORAL VEIN OF LEFT LOWER EXTREMITY (HCC): ICD-10-CM

## 2022-10-06 DIAGNOSIS — I80.202 DEEP VEIN THROMBOPHLEBITIS OF LEFT LEG (HCC): ICD-10-CM

## 2022-10-06 PROBLEM — I82.409 DEEP VEIN THROMBOSIS (HCC): Status: ACTIVE | Noted: 2022-10-06

## 2022-10-06 PROCEDURE — 99213 OFFICE O/P EST LOW 20 MIN: CPT

## 2022-10-06 NOTE — PROGRESS NOTES
Initial anticoagulation clinic note and most recent ED note reviewed    Pending further recommendations, we will continue with 3 months of oral anticoagulation with Eliquis for femoral DVT apparently provoked by OCP use as directed by PCP.  After 3 months we will check back with PCP for further recommendations    Will defer any indicated age appropriate screening for occult malignancy to pcp.    Michael Bloch, MD  Anticoagulation Clinic    Cc: HELEN Haro

## 2022-10-18 ENCOUNTER — ANTICOAGULATION VISIT (OUTPATIENT)
Dept: VASCULAR LAB | Facility: MEDICAL CENTER | Age: 47
End: 2022-10-18
Attending: INTERNAL MEDICINE
Payer: COMMERCIAL

## 2022-10-18 DIAGNOSIS — I82.412 ACUTE DEEP VEIN THROMBOSIS (DVT) OF FEMORAL VEIN OF LEFT LOWER EXTREMITY (HCC): ICD-10-CM

## 2022-10-18 PROCEDURE — 99211 OFF/OP EST MAY X REQ PHY/QHP: CPT

## 2022-10-18 NOTE — PROGRESS NOTES
Target end date:12/28/22  Indication: Acute LLE DVT  Drug: Eliquis 5 mg bid  CHADsVASC = n/a  HAS-BLED = 0    Health Status Since Last Assessment  Patient denies any new relevant medical problems, ED visits or hospitalizations  Patient denies any embolic events (stroke/tia/systemic embolism)    Adherence with DOAC Therapy  Pt has not missed any doses in the average week    Bleeding Risk Assessment  denies Epistaxis  Pt denies any excessive or unusual bleeding/hematomas.  Pt denies any GI bleeds or hematemesis.  Pt denies any concerning daily headache or sub dural hematoma symptoms.    Pt denies any hematuria or abnormal vaginal bleeding.  Latest Hemoglobin wnl  Platelets: wnl  ETOH overuse denies     Creatinine Clearance/Renal Function  Latest CrCl >30 min    Hepatic function  Latest LFTs wnl  Pt denies any history of liver dysfunction      Drug Interactions  Medications reconciled   ASA/other antiplatelets no  NSAID no  Other drug interactions no  Verified no anticonvulsant or azole therapy, education provided for future use.     Examination  There were no vitals filed for this visit.  Symptomatic hypotension no  Significant gait impairment/imbalance/high risk for falls? no    Final Assessment and Recommendations:  Patient appears stable from the anticoagulation standpoint.    Patient is  able to afford DOAC    Benefits of continued DOAC therapy outweigh risks for this patient  Recommend pt continue with current DOAC therapy     Follow up:  LOT scheduled for 2 months if PCP recommends further vascular evaluation for anticoagulation length of therapy. If PCP agrees w/ 3 months duration, we can cancel LOT appt.     Linh Gasca, PharmD

## 2022-10-31 ENCOUNTER — APPOINTMENT (OUTPATIENT)
Dept: RADIOLOGY | Facility: MEDICAL CENTER | Age: 47
End: 2022-10-31
Attending: EMERGENCY MEDICINE
Payer: COMMERCIAL

## 2022-10-31 ENCOUNTER — HOSPITAL ENCOUNTER (EMERGENCY)
Facility: MEDICAL CENTER | Age: 47
End: 2022-10-31
Attending: EMERGENCY MEDICINE
Payer: COMMERCIAL

## 2022-10-31 VITALS
TEMPERATURE: 98.5 F | HEIGHT: 65 IN | HEART RATE: 86 BPM | BODY MASS INDEX: 18.44 KG/M2 | OXYGEN SATURATION: 94 % | WEIGHT: 110.67 LBS | SYSTOLIC BLOOD PRESSURE: 104 MMHG | DIASTOLIC BLOOD PRESSURE: 66 MMHG | RESPIRATION RATE: 16 BRPM

## 2022-10-31 DIAGNOSIS — R11.2 NAUSEA AND VOMITING, UNSPECIFIED VOMITING TYPE: ICD-10-CM

## 2022-10-31 DIAGNOSIS — M79.89 LEG SWELLING: ICD-10-CM

## 2022-10-31 DIAGNOSIS — M79.672 LEFT FOOT PAIN: ICD-10-CM

## 2022-10-31 LAB
ALBUMIN SERPL BCP-MCNC: 4.8 G/DL (ref 3.2–4.9)
ALBUMIN/GLOB SERPL: 1.5 G/DL
ALP SERPL-CCNC: 71 U/L (ref 30–99)
ALT SERPL-CCNC: 23 U/L (ref 2–50)
ANION GAP SERPL CALC-SCNC: 12 MMOL/L (ref 7–16)
AST SERPL-CCNC: 23 U/L (ref 12–45)
BASOPHILS # BLD AUTO: 0.6 % (ref 0–1.8)
BASOPHILS # BLD: 0.05 K/UL (ref 0–0.12)
BILIRUB SERPL-MCNC: 0.6 MG/DL (ref 0.1–1.5)
BUN SERPL-MCNC: 8 MG/DL (ref 8–22)
CALCIUM SERPL-MCNC: 9.8 MG/DL (ref 8.4–10.2)
CHLORIDE SERPL-SCNC: 104 MMOL/L (ref 96–112)
CO2 SERPL-SCNC: 24 MMOL/L (ref 20–33)
CREAT SERPL-MCNC: 0.59 MG/DL (ref 0.5–1.4)
EOSINOPHIL # BLD AUTO: 0.03 K/UL (ref 0–0.51)
EOSINOPHIL NFR BLD: 0.3 % (ref 0–6.9)
ERYTHROCYTE [DISTWIDTH] IN BLOOD BY AUTOMATED COUNT: 41.4 FL (ref 35.9–50)
GFR SERPLBLD CREATININE-BSD FMLA CKD-EPI: 113 ML/MIN/1.73 M 2
GLOBULIN SER CALC-MCNC: 3.2 G/DL (ref 1.9–3.5)
GLUCOSE SERPL-MCNC: 97 MG/DL (ref 65–99)
HCT VFR BLD AUTO: 46.7 % (ref 37–47)
HGB BLD-MCNC: 15.5 G/DL (ref 12–16)
IMM GRANULOCYTES # BLD AUTO: 0.02 K/UL (ref 0–0.11)
IMM GRANULOCYTES NFR BLD AUTO: 0.2 % (ref 0–0.9)
LYMPHOCYTES # BLD AUTO: 1.01 K/UL (ref 1–4.8)
LYMPHOCYTES NFR BLD: 11.6 % (ref 22–41)
MCH RBC QN AUTO: 30.7 PG (ref 27–33)
MCHC RBC AUTO-ENTMCNC: 33.2 G/DL (ref 33.6–35)
MCV RBC AUTO: 92.5 FL (ref 81.4–97.8)
MONOCYTES # BLD AUTO: 0.31 K/UL (ref 0–0.85)
MONOCYTES NFR BLD AUTO: 3.6 % (ref 0–13.4)
NEUTROPHILS # BLD AUTO: 7.29 K/UL (ref 2–7.15)
NEUTROPHILS NFR BLD: 83.7 % (ref 44–72)
NRBC # BLD AUTO: 0 K/UL
NRBC BLD-RTO: 0 /100 WBC
PLATELET # BLD AUTO: 247 K/UL (ref 164–446)
PMV BLD AUTO: 9 FL (ref 9–12.9)
POTASSIUM SERPL-SCNC: 3.9 MMOL/L (ref 3.6–5.5)
PROT SERPL-MCNC: 8 G/DL (ref 6–8.2)
RBC # BLD AUTO: 5.05 M/UL (ref 4.2–5.4)
SODIUM SERPL-SCNC: 140 MMOL/L (ref 135–145)
WBC # BLD AUTO: 8.7 K/UL (ref 4.8–10.8)

## 2022-10-31 PROCEDURE — 99284 EMERGENCY DEPT VISIT MOD MDM: CPT

## 2022-10-31 PROCEDURE — 80053 COMPREHEN METABOLIC PANEL: CPT

## 2022-10-31 PROCEDURE — 93971 EXTREMITY STUDY: CPT | Mod: LT

## 2022-10-31 PROCEDURE — 85025 COMPLETE CBC W/AUTO DIFF WBC: CPT

## 2022-10-31 PROCEDURE — 700105 HCHG RX REV CODE 258: Performed by: EMERGENCY MEDICINE

## 2022-10-31 PROCEDURE — 700111 HCHG RX REV CODE 636 W/ 250 OVERRIDE (IP): Performed by: EMERGENCY MEDICINE

## 2022-10-31 PROCEDURE — 96374 THER/PROPH/DIAG INJ IV PUSH: CPT

## 2022-10-31 PROCEDURE — 36415 COLL VENOUS BLD VENIPUNCTURE: CPT

## 2022-10-31 RX ORDER — SODIUM CHLORIDE 9 MG/ML
1000 INJECTION, SOLUTION INTRAVENOUS ONCE
Status: COMPLETED | OUTPATIENT
Start: 2022-10-31 | End: 2022-10-31

## 2022-10-31 RX ORDER — M-VIT,TX,IRON,MINS/CALC/FOLIC 27MG-0.4MG
1 TABLET ORAL DAILY
Status: SHIPPED | COMMUNITY
End: 2022-12-08

## 2022-10-31 RX ORDER — ONDANSETRON 4 MG/1
4 TABLET, ORALLY DISINTEGRATING ORAL EVERY 6 HOURS PRN
Qty: 10 TABLET | Refills: 0 | Status: SHIPPED | OUTPATIENT
Start: 2022-10-31 | End: 2024-03-07

## 2022-10-31 RX ORDER — ONDANSETRON 2 MG/ML
4 INJECTION INTRAMUSCULAR; INTRAVENOUS ONCE
Status: COMPLETED | OUTPATIENT
Start: 2022-10-31 | End: 2022-10-31

## 2022-10-31 RX ADMIN — ONDANSETRON 4 MG: 2 INJECTION INTRAMUSCULAR; INTRAVENOUS at 13:23

## 2022-10-31 RX ADMIN — SODIUM CHLORIDE 1000 ML: 9 INJECTION, SOLUTION INTRAVENOUS at 13:23

## 2022-10-31 ASSESSMENT — FIBROSIS 4 INDEX: FIB4 SCORE: 0.93

## 2022-10-31 NOTE — ED TRIAGE NOTES
Chief Complaint   Patient presents with    Leg Swelling     Pt had LLE DVT 9/27 and was treated. L foot pain, left calf/foot swelling increasing x 2 weeks, worse in past 2 days.     N/V     N/V since last night. Denies abdominal pain.    Taking eliquis.   Denies CP or SOB.

## 2022-10-31 NOTE — ED PROVIDER NOTES
ED Provider Note    Scribed for Dimitry Vick M.D. by Brian Benitez. 10/31/2022  12:25 PM    Primary care provider: Enrique GRULLON M.D.  Means of arrival: Walk-in  History obtained from: Patient  History limited by: None     CHIEF COMPLAINT  Chief Complaint   Patient presents with    Leg Swelling     Pt had LLE DVT 9/27 and was treated. L foot pain, left calf/foot swelling increasing x 2 weeks, worse in past 2 days.     N/V     N/V since last night. Denies abdominal pain.        LUKE Gerardo is a 47 y.o. female who presents to the Emergency Department for evaluation of left lower extremity swelling and pain onset two weeks ago. She states that she was diagnosed with and treated for a left lower extremity  DVT on 9/27/2022. Then two weeks ago she again began experiencing swelling and pain in her left calf and foot. This has become significantly worse over the past two days. Prompting her to present to the ED. She describes the pain as a pins and needles burning sensation. She notes that she has been wearing a compression sock every night for a few hours and thought this may be contributing to the pain. She has associated symptoms of difficulty sleeping secondary to pain, and nausea and vomiting that began today. She denies any abdominal pain. She is able to keep her medication and some fluids down. She last presented to the anticoagulation clinic two weeks ago and has been taking her eliquis everyday as directed. There are no known alleviating or exacerbating factors.     REVIEW OF SYSTEMS  Pertinent positives include left lower extremity swelling and pain, difficulty sleeping secondary to pain, nausea, and vomiting. Pertinent negatives include no abdominal pain.  All other systems reviewed and negative.    PAST MEDICAL HISTORY  DVT,  has a past medical history of Patient denies medical problems.    SURGICAL HISTORY   has a past surgical history that includes nasal reconstruction.    SOCIAL  "HISTORY  Social History     Tobacco Use    Smoking status: Never    Smokeless tobacco: Never   Vaping Use    Vaping Use: Never used   Substance Use Topics    Alcohol use: Yes    Drug use: No      Social History     Substance and Sexual Activity   Drug Use No       FAMILY HISTORY  Family History   Problem Relation Age of Onset    Hypertension Father     Hyperlipidemia Sister        CURRENT MEDICATIONS  Current Outpatient Medications   Medication Instructions    apixaban (ELIQUIS) 5 mg, Oral, 2 TIMES DAILY    diphenhydrAMINE HCl, Sleep, (ZZZQUIL PO) 1 Capsule, Oral, EVERY BEDTIME PRN       ALLERGIES  Allergies   Allergen Reactions    Pcn [Penicillins] Hives     RXN=as a child    Sulfa Drugs Hives     RXN=as a child       PHYSICAL EXAM  VITAL SIGNS: /81   Pulse 94   Temp 36.6 °C (97.9 °F) (Temporal)   Resp 16   Ht 1.651 m (5' 5\")   Wt 50.2 kg (110 lb 10.7 oz)   LMP 09/10/2020 (Exact Date)   SpO2 97%   BMI 18.42 kg/m²     Constitutional: Well developed, Well nourished, Mild distress, Non-toxic appearance.   HENT: Normocephalic, Atraumatic, Bilateral external ears normal, Dry mucous membranes, No oral exudates.   Eyes: PERRLA, EOMI, Conjunctiva normal, No discharge.   Neck: No tenderness, Supple, No stridor.   Lymphatic: No lymphadenopathy noted.   Cardiovascular: Normal heart rate, Normal rhythm.   Thorax & Lungs: Clear to auscultation bilaterally, No respiratory distress, No wheezing, No crackles.   Abdomen: Soft, No tenderness, No masses, No pulsatile masses.   Skin: Warm, Dry, No erythema, No cellulitis, No rash.   Extremities: Left extremity with slight soft tissues swelling. Tenderness along plantar factitia. 2+ pulses. No erythema or cellulitis. No cyanosis.   Musculoskeletal: No tenderness to palpation or major deformities noted.  Intact distal pulses  Neurologic: Awake, alert. Moves all extremities spontaneously.  Psychiatric: Affect normal, Judgment normal, Mood normal.     LABS  Results for " orders placed or performed during the hospital encounter of 10/31/22   CBC WITH DIFFERENTIAL   Result Value Ref Range    WBC 8.7 4.8 - 10.8 K/uL    RBC 5.05 4.20 - 5.40 M/uL    Hemoglobin 15.5 12.0 - 16.0 g/dL    Hematocrit 46.7 37.0 - 47.0 %    MCV 92.5 81.4 - 97.8 fL    MCH 30.7 27.0 - 33.0 pg    MCHC 33.2 (L) 33.6 - 35.0 g/dL    RDW 41.4 35.9 - 50.0 fL    Platelet Count 247 164 - 446 K/uL    MPV 9.0 9.0 - 12.9 fL    Neutrophils-Polys 83.70 (H) 44.00 - 72.00 %    Lymphocytes 11.60 (L) 22.00 - 41.00 %    Monocytes 3.60 0.00 - 13.40 %    Eosinophils 0.30 0.00 - 6.90 %    Basophils 0.60 0.00 - 1.80 %    Immature Granulocytes 0.20 0.00 - 0.90 %    Nucleated RBC 0.00 /100 WBC    Neutrophils (Absolute) 7.29 (H) 2.00 - 7.15 K/uL    Lymphs (Absolute) 1.01 1.00 - 4.80 K/uL    Monos (Absolute) 0.31 0.00 - 0.85 K/uL    Eos (Absolute) 0.03 0.00 - 0.51 K/uL    Baso (Absolute) 0.05 0.00 - 0.12 K/uL    Immature Granulocytes (abs) 0.02 0.00 - 0.11 K/uL    NRBC (Absolute) 0.00 K/uL   COMP METABOLIC PANEL   Result Value Ref Range    Sodium 140 135 - 145 mmol/L    Potassium 3.9 3.6 - 5.5 mmol/L    Chloride 104 96 - 112 mmol/L    Co2 24 20 - 33 mmol/L    Anion Gap 12.0 7.0 - 16.0    Glucose 97 65 - 99 mg/dL    Bun 8 8 - 22 mg/dL    Creatinine 0.59 0.50 - 1.40 mg/dL    Calcium 9.8 8.4 - 10.2 mg/dL    AST(SGOT) 23 12 - 45 U/L    ALT(SGPT) 23 2 - 50 U/L    Alkaline Phosphatase 71 30 - 99 U/L    Total Bilirubin 0.6 0.1 - 1.5 mg/dL    Albumin 4.8 3.2 - 4.9 g/dL    Total Protein 8.0 6.0 - 8.2 g/dL    Globulin 3.2 1.9 - 3.5 g/dL    A-G Ratio 1.5 g/dL   ESTIMATED GFR   Result Value Ref Range    GFR (CKD-EPI) 113 >60 mL/min/1.73 m 2        RADIOLOGY  US-EXTREMITY VENOUS LOWER UNILAT LEFT   Final Result        The radiologist's interpretation of all radiological studies have been reviewed by me.      COURSE & MEDICAL DECISION MAKING  Pertinent Labs & Imaging studies reviewed. (See chart for details)    I reviewed the patient's medical records  which showed the patient was diagnosed with a DVT one month ago and was last seen at the anticoagulation clinic two weeks ago.     12:25 PM - Patient seen and examined at bedside. Patient will be treated with NS infusion 1,000 mL and Zofran injection 4 mg. Ordered US-Extremity venous lower unilateral left, CBC with differential, and CMP to evaluate her symptoms. The differential diagnoses include but are not limited to: Peripheral neuropathy, Viral, Anxiety.    2:20 PM - I reevaluated the patient at bedside. The patient informs me they feel improved following fluid and medication administration. I discussed the patient's diagnostic study results. I discussed plan for discharge and follow up as outlined below. I informed the patient I will be prescribing zofran for her nausea. The patient is stable for discharge at this time and will return for any new or worsening symptoms. Patient verbalizes understanding and support with my plan for discharge.       Decision Making:  Left leg pain and swelling.  Believe the patient has neuropathic pain on her foot possibly from her compression stockings.  Repeat ultrasound does not show a DVT.  The patient is having nausea vomiting possibly from anxiety, give the patient IV fluids, Zofran, the patient is feeling improved.  Will discharge patient home on Zofran, have the patient follow-up with Dr. Haro, have the patient return with worsening symptoms.    HYDRATION: Based on the patient's presentation of Acute Vomiting the patient was given IV fluids. IV Hydration was used because oral hydration was not adequate alone. Upon recheck following hydration, the patient was improved.    The patient will return for new or worsening symptoms and is stable at the time of discharge.    The patient is referred to a primary physician for blood pressure management, diabetic screening, and for all other preventative health concerns.    DISPOSITION:  Patient will be discharged home in stable  condition.    FOLLOW UP:  [facility], Emergency Dept  [address]  [place] [id number] [phone]    If symptoms worsen    Enrique GRULLON M.D.  [address]  [place] [id number] [phone]          OUTPATIENT MEDICATIONS:  Discharge Medication List as of 10/31/2022  2:32 PM        START taking these medications    Details   ondansetron (ZOFRAN ODT) 4 MG TABLET DISPERSIBLE Take 1 Tablet by mouth every 6 hours as needed for Nausea., Disp-10 Tablet, R-0, Normal             FINAL IMPRESSION  1. Leg swelling    2. Left foot pain    3. Nausea and vomiting, unspecified vomiting type          I, Brian Benitez (Darell), am scribing for, and in the presence of, Dimitry Vick M.D..    Electronically signed by: Brian Benitez (Justinibjamilah), 10/31/2022    IDimitry M.D. personally performed the services described in this documentation, as scribed by Brian Benitez in my presence, and it is both accurate and complete.    The note accurately reflects work and decisions made by me.  Dimitry Vick M.D.  10/31/2022  3:24 PM

## 2022-10-31 NOTE — ED NOTES
Patient discharged home in stable condition with visitor  ELIEZER provided with recommended follow up and home care instructions and education information  Prescription for zofran ODT provided at this time  Patient and visitor verbalized understanding  Ambulatory at time of discharge

## 2022-11-17 ENCOUNTER — DOCUMENTATION (OUTPATIENT)
Dept: VASCULAR LAB | Facility: MEDICAL CENTER | Age: 47
End: 2022-11-17
Payer: COMMERCIAL

## 2022-11-17 DIAGNOSIS — Z79.01 CHRONIC ANTICOAGULATION: ICD-10-CM

## 2022-11-17 DIAGNOSIS — I82.412 ACUTE DEEP VEIN THROMBOSIS (DVT) OF FEMORAL VEIN OF LEFT LOWER EXTREMITY (HCC): ICD-10-CM

## 2022-11-17 NOTE — PROGRESS NOTES
[facility]        Patient called the clinic today to report that she has had the worst n/v which she feels is attributed to Eliquis since it is the only newly added medication she is taking.   She would like to switch to a different DOAC to see if her n/v improves as she has been so sick she is fearful she will lose her job.   Will send in rx for Xarelto 20mg QD. Patient instructed to stay on Eliquis until she has Xarelto in hand and will then stop Eliquis, and begin Xarelto once daily with meal for the remainder of treatment. She will continue with her follow up in clinic on 12/28/22 for LOT.   Patient will contact the clinic for any further questions or concerns.     Maik MarcosD

## 2022-11-18 ENCOUNTER — ANTICOAGULATION VISIT (OUTPATIENT)
Dept: VASCULAR LAB | Facility: MEDICAL CENTER | Age: 47
End: 2022-11-18
Attending: INTERNAL MEDICINE
Payer: COMMERCIAL

## 2022-11-18 ENCOUNTER — HOSPITAL ENCOUNTER (EMERGENCY)
Facility: MEDICAL CENTER | Age: 47
End: 2022-11-18
Attending: EMERGENCY MEDICINE
Payer: COMMERCIAL

## 2022-11-18 VITALS
RESPIRATION RATE: 16 BRPM | DIASTOLIC BLOOD PRESSURE: 71 MMHG | HEIGHT: 65 IN | BODY MASS INDEX: 18.44 KG/M2 | HEART RATE: 96 BPM | OXYGEN SATURATION: 98 % | TEMPERATURE: 98.1 F | SYSTOLIC BLOOD PRESSURE: 120 MMHG | WEIGHT: 110.67 LBS

## 2022-11-18 DIAGNOSIS — R10.13 EPIGASTRIC PAIN: ICD-10-CM

## 2022-11-18 DIAGNOSIS — I82.412 ACUTE DEEP VEIN THROMBOSIS (DVT) OF FEMORAL VEIN OF LEFT LOWER EXTREMITY (HCC): ICD-10-CM

## 2022-11-18 LAB
ALBUMIN SERPL BCP-MCNC: 4.4 G/DL (ref 3.2–4.9)
ALBUMIN/GLOB SERPL: 1.5 G/DL
ALP SERPL-CCNC: 62 U/L (ref 30–99)
ALT SERPL-CCNC: 15 U/L (ref 2–50)
ANION GAP SERPL CALC-SCNC: 11 MMOL/L (ref 7–16)
AST SERPL-CCNC: 16 U/L (ref 12–45)
BASOPHILS # BLD AUTO: 0.7 % (ref 0–1.8)
BASOPHILS # BLD: 0.08 K/UL (ref 0–0.12)
BILIRUB SERPL-MCNC: 0.7 MG/DL (ref 0.1–1.5)
BUN SERPL-MCNC: 11 MG/DL (ref 8–22)
CALCIUM SERPL-MCNC: 9.6 MG/DL (ref 8.4–10.2)
CHLORIDE SERPL-SCNC: 102 MMOL/L (ref 96–112)
CO2 SERPL-SCNC: 24 MMOL/L (ref 20–33)
CREAT SERPL-MCNC: 0.72 MG/DL (ref 0.5–1.4)
EOSINOPHIL # BLD AUTO: 0.24 K/UL (ref 0–0.51)
EOSINOPHIL NFR BLD: 2.2 % (ref 0–6.9)
ERYTHROCYTE [DISTWIDTH] IN BLOOD BY AUTOMATED COUNT: 40.9 FL (ref 35.9–50)
GFR SERPLBLD CREATININE-BSD FMLA CKD-EPI: 105 ML/MIN/1.73 M 2
GLOBULIN SER CALC-MCNC: 2.9 G/DL (ref 1.9–3.5)
GLUCOSE SERPL-MCNC: 92 MG/DL (ref 65–99)
HCT VFR BLD AUTO: 44 % (ref 37–47)
HGB BLD-MCNC: 14.7 G/DL (ref 12–16)
IMM GRANULOCYTES # BLD AUTO: 0.04 K/UL (ref 0–0.11)
IMM GRANULOCYTES NFR BLD AUTO: 0.4 % (ref 0–0.9)
LIPASE SERPL-CCNC: 57 U/L (ref 7–58)
LYMPHOCYTES # BLD AUTO: 1.4 K/UL (ref 1–4.8)
LYMPHOCYTES NFR BLD: 12.7 % (ref 22–41)
MCH RBC QN AUTO: 30.9 PG (ref 27–33)
MCHC RBC AUTO-ENTMCNC: 33.4 G/DL (ref 33.6–35)
MCV RBC AUTO: 92.6 FL (ref 81.4–97.8)
MONOCYTES # BLD AUTO: 0.74 K/UL (ref 0–0.85)
MONOCYTES NFR BLD AUTO: 6.7 % (ref 0–13.4)
NEUTROPHILS # BLD AUTO: 8.49 K/UL (ref 2–7.15)
NEUTROPHILS NFR BLD: 77.3 % (ref 44–72)
NRBC # BLD AUTO: 0 K/UL
NRBC BLD-RTO: 0 /100 WBC
PLATELET # BLD AUTO: 237 K/UL (ref 164–446)
PMV BLD AUTO: 8.8 FL (ref 9–12.9)
POTASSIUM SERPL-SCNC: 3.7 MMOL/L (ref 3.6–5.5)
PROT SERPL-MCNC: 7.3 G/DL (ref 6–8.2)
RBC # BLD AUTO: 4.75 M/UL (ref 4.2–5.4)
SODIUM SERPL-SCNC: 137 MMOL/L (ref 135–145)
WBC # BLD AUTO: 11 K/UL (ref 4.8–10.8)

## 2022-11-18 PROCEDURE — 36415 COLL VENOUS BLD VENIPUNCTURE: CPT

## 2022-11-18 PROCEDURE — 700111 HCHG RX REV CODE 636 W/ 250 OVERRIDE (IP): Performed by: EMERGENCY MEDICINE

## 2022-11-18 PROCEDURE — 96374 THER/PROPH/DIAG INJ IV PUSH: CPT

## 2022-11-18 PROCEDURE — 85025 COMPLETE CBC W/AUTO DIFF WBC: CPT

## 2022-11-18 PROCEDURE — 80053 COMPREHEN METABOLIC PANEL: CPT

## 2022-11-18 PROCEDURE — 99284 EMERGENCY DEPT VISIT MOD MDM: CPT

## 2022-11-18 PROCEDURE — 99213 OFFICE O/P EST LOW 20 MIN: CPT

## 2022-11-18 PROCEDURE — 83690 ASSAY OF LIPASE: CPT

## 2022-11-18 PROCEDURE — 96375 TX/PRO/DX INJ NEW DRUG ADDON: CPT

## 2022-11-18 RX ORDER — OMEPRAZOLE 40 MG/1
40 CAPSULE, DELAYED RELEASE ORAL 2 TIMES DAILY
Qty: 20 CAPSULE | Refills: 0 | Status: SHIPPED | OUTPATIENT
Start: 2022-11-18 | End: 2022-11-18 | Stop reason: SDUPTHER

## 2022-11-18 RX ORDER — OMEPRAZOLE 40 MG/1
40 CAPSULE, DELAYED RELEASE ORAL 2 TIMES DAILY
Qty: 20 CAPSULE | Refills: 0 | Status: SHIPPED | OUTPATIENT
Start: 2022-11-18 | End: 2022-11-28

## 2022-11-18 RX ORDER — ONDANSETRON 2 MG/ML
4 INJECTION INTRAMUSCULAR; INTRAVENOUS ONCE
Status: COMPLETED | OUTPATIENT
Start: 2022-11-18 | End: 2022-11-18

## 2022-11-18 RX ADMIN — ONDANSETRON 4 MG: 2 INJECTION INTRAMUSCULAR; INTRAVENOUS at 09:17

## 2022-11-18 RX ADMIN — FAMOTIDINE 20 MG: 10 INJECTION, SOLUTION INTRAVENOUS at 09:17

## 2022-11-18 ASSESSMENT — PAIN DESCRIPTION - PAIN TYPE: TYPE: ACUTE PAIN

## 2022-11-18 ASSESSMENT — FIBROSIS 4 INDEX: FIB4 SCORE: 0.87

## 2022-11-18 ASSESSMENT — PAIN DESCRIPTION - DESCRIPTORS: DESCRIPTORS: BURNING

## 2022-11-18 NOTE — ED PROVIDER NOTES
ED Provider Note    CHIEF COMPLAINT  Chief Complaint   Patient presents with    Bloody Stools       HPI  Karis Gerardo is a 47 y.o. female who presents with abdominal pain.  The patient states that ever since she was diagnosed with a DVT and started on Eliquis at the end of September she said periodic cramping abdominal pain with associated nausea and vomiting.  Recently she has noticed some bright red blood in her stool as well as in her emesis.  She states that she was evaluated at [facility] on 31 October and she states she had a repeat ultrasound that did not show any evidence of a DVT.  The patient is asking if she can come off the Eliquis.  She states that she stopped birth control when she was notified of the DVT.  She denies marijuana use.  She states occasionally she uses alcohol.  She states she is otherwise healthy.    REVIEW OF SYSTEMS  See HPI for further details. All other systems are negative.     PAST MEDICAL HISTORY  Past Medical History:   Diagnosis Date    Patient denies medical problems        FAMILY HISTORY  [unfilled]    SOCIAL HISTORY  Social History     Socioeconomic History    Marital status:    Tobacco Use    Smoking status: Never    Smokeless tobacco: Never   Vaping Use    Vaping Use: Never used   Substance and Sexual Activity    Alcohol use: Yes    Drug use: No       SURGICAL HISTORY  Past Surgical History:   Procedure Laterality Date    NASAL RECONSTRUCTION         CURRENT MEDICATIONS  Home Medications       Reviewed by Sil Vick R.N. (Registered Nurse) on 11/18/22 at 0853  Med List Status: <None>     Medication Last Dose Status   ondansetron (ZOFRAN ODT) 4 MG TABLET DISPERSIBLE  Active   rivaroxaban (XARELTO) 20 MG Tab tablet  Active   therapeutic multivitamin-minerals (THERAGRAN-M) Tab  Active                    ALLERGIES  Allergies   Allergen Reactions    Pcn [Penicillins] Hives     RXN=as a child    Sulfa Drugs Hives     RXN=as a child       PHYSICAL  "EXAM  VITAL SIGNS: /86   Pulse 99   Temp 37 °C (98.6 °F) (Temporal)   Resp 18   Ht 1.651 m (5' 5\")   Wt 50.2 kg (110 lb 10.7 oz)   LMP 09/10/2020 (Exact Date)   SpO2 94%   BMI 18.42 kg/m²       Constitutional: Well developed, Well nourished, No acute distress, Non-toxic appearance.   HENT: Normocephalic, Atraumatic, Bilateral external ears normal, Oropharynx moist, No oral exudates, Nose normal.   Eyes: PERRLA, EOMI, Conjunctiva normal, No discharge.   Neck: Normal range of motion, No tenderness, Supple, No stridor.   Lymphatic: No lymphadenopathy noted.   Cardiovascular: Normal heart rate, Normal rhythm, No murmurs, No rubs, No gallops.   Thorax & Lungs: Normal breath sounds, No respiratory distress, No wheezing, No chest tenderness.   Abdomen: Bowel sounds normal, Soft, epigastric tenderness, No masses, No pulsatile masses.   Skin: Warm, Dry, No erythema, No rash.   Back: No tenderness, No CVA tenderness. .   Rectal: Small fissure superiorly located  Extremities: Intact distal pulses, No edema, No tenderness, No cyanosis, No clubbing.    Neurologic: Alert & oriented x 3, Normal motor function, Normal sensory function, No focal deficits noted.   Psychiatric: Affect normal, Judgment normal, Mood normal.     Results for orders placed or performed during the hospital encounter of 11/18/22   CBC WITH DIFFERENTIAL   Result Value Ref Range    WBC 11.0 (H) 4.8 - 10.8 K/uL    RBC 4.75 4.20 - 5.40 M/uL    Hemoglobin 14.7 12.0 - 16.0 g/dL    Hematocrit 44.0 37.0 - 47.0 %    MCV 92.6 81.4 - 97.8 fL    MCH 30.9 27.0 - 33.0 pg    MCHC 33.4 (L) 33.6 - 35.0 g/dL    RDW 40.9 35.9 - 50.0 fL    Platelet Count 237 164 - 446 K/uL    MPV 8.8 (L) 9.0 - 12.9 fL    Neutrophils-Polys 77.30 (H) 44.00 - 72.00 %    Lymphocytes 12.70 (L) 22.00 - 41.00 %    Monocytes 6.70 0.00 - 13.40 %    Eosinophils 2.20 0.00 - 6.90 %    Basophils 0.70 0.00 - 1.80 %    Immature Granulocytes 0.40 0.00 - 0.90 %    Nucleated RBC 0.00 /100 WBC    " Neutrophils (Absolute) 8.49 (H) 2.00 - 7.15 K/uL    Lymphs (Absolute) 1.40 1.00 - 4.80 K/uL    Monos (Absolute) 0.74 0.00 - 0.85 K/uL    Eos (Absolute) 0.24 0.00 - 0.51 K/uL    Baso (Absolute) 0.08 0.00 - 0.12 K/uL    Immature Granulocytes (abs) 0.04 0.00 - 0.11 K/uL    NRBC (Absolute) 0.00 K/uL   COMP METABOLIC PANEL   Result Value Ref Range    Sodium 137 135 - 145 mmol/L    Potassium 3.7 3.6 - 5.5 mmol/L    Chloride 102 96 - 112 mmol/L    Co2 24 20 - 33 mmol/L    Anion Gap 11.0 7.0 - 16.0    Glucose 92 65 - 99 mg/dL    Bun 11 8 - 22 mg/dL    Creatinine 0.72 0.50 - 1.40 mg/dL    Calcium 9.6 8.4 - 10.2 mg/dL    AST(SGOT) 16 12 - 45 U/L    ALT(SGPT) 15 2 - 50 U/L    Alkaline Phosphatase 62 30 - 99 U/L    Total Bilirubin 0.7 0.1 - 1.5 mg/dL    Albumin 4.4 3.2 - 4.9 g/dL    Total Protein 7.3 6.0 - 8.2 g/dL    Globulin 2.9 1.9 - 3.5 g/dL    A-G Ratio 1.5 g/dL   LIPASE   Result Value Ref Range    Lipase 57 7 - 58 U/L   ESTIMATED GFR   Result Value Ref Range    GFR (CKD-EPI) 105 >60 mL/min/1.73 m 2       COURSE & MEDICAL DECISION MAKING  Pertinent Labs & Imaging studies reviewed. (See chart for details)  This a 47-year-old female who presents the emergency department with blood in her stool.  I suspect this is from the rectal fissure noted.  The patient would like to come off of her Eliquis.  I told the patient she needs to speak with her physician although this sounds like a reasonable choice.  The patient had a DVT when she was on birth control tablets and she had an ultrasound recently showed no further thrombus.  As for the patient's periodic epigastric pain and nausea and vomiting I suspect she does have significant gastritis.  She does not have a significant anemia.  The patient's lipase does not support pancreatitis.  She does not have a leukocytosis to support cholecystitis.  The patient did receive IV Zofran as well as Pepcid.  I will place the patient on 40 mg of Prilosec twice a day for the next 10 days.  She  already has a scheduled follow-up with her gastroenterologist later on this month.  She also recently had a CT scan that was negative except for a fatty liver.  The patient's abdomen is nonsurgical.  She will be discharged with instructions to return if she is acutely worse.    FINAL IMPRESSION  1.  Epigastric pain  2.  Suspect gastritis  3.  Rectal fissure  4.  Iatrogenic coagulopathy    Disposition  The patient will be discharged in stable condition         Electronically signed by: Terrence Hopson M.D., 11/18/2022 9:05 AM

## 2022-11-18 NOTE — ED NOTES
Discharge instructions given and discussed. RX for Prilosec given and patient educated to come back to ER for new or worsening symptoms. Instructed to follow up with PCP and GI. Patient verbalized understanding,IV removed and patient discharged in stable condition.

## 2022-11-18 NOTE — ED TRIAGE NOTES
"Pt had a DVT 09/27/2022 and was started on Eliquis. Pt immediately stopped birthcontrol - last menstrual cycle 09/10/2022. Since starting medication pt has been experiencing N/V. Then this morning she experienced bright red blood in stool and emesis. Mild to moderate burning pain in upper abdomen. Pt also states she is experiencing loss of appetite, \"brain fog\", and fatigue.   "

## 2022-11-19 NOTE — PROGRESS NOTES
NEW DOAC   .  Anticoagulation Patient Findings      PCP: Enrique GRULLON M.D.  Cardiologist: None  Dx: Recent DVT of LLE  CHADSVASC = N/A  HAS-BLED = 0  Target End Date = TBD, likely 12/28/2022    Pt Hx: Patient reports she was on oral contraceptives while going through menopause and had COVID at the time of there DVT   Labs:  Lab Results   Component Value Date/Time    WBC 11.0 (H) 11/18/2022 09:00 AM    RBC 4.75 11/18/2022 09:00 AM    HEMOGLOBIN 14.7 11/18/2022 09:00 AM    HEMATOCRIT 44.0 11/18/2022 09:00 AM    MCV 92.6 11/18/2022 09:00 AM    MCH 30.9 11/18/2022 09:00 AM    MCHC 33.4 (L) 11/18/2022 09:00 AM    MPV 8.8 (L) 11/18/2022 09:00 AM    NEUTSPOLYS 77.30 (H) 11/18/2022 09:00 AM    LYMPHOCYTES 12.70 (L) 11/18/2022 09:00 AM    MONOCYTES 6.70 11/18/2022 09:00 AM    EOSINOPHILS 2.20 11/18/2022 09:00 AM    BASOPHILS 0.70 11/18/2022 09:00 AM      Lab Results   Component Value Date/Time    SODIUM 137 11/18/2022 09:00 AM    POTASSIUM 3.7 11/18/2022 09:00 AM    CHLORIDE 102 11/18/2022 09:00 AM    CO2 24 11/18/2022 09:00 AM    GLUCOSE 92 11/18/2022 09:00 AM    BUN 11 11/18/2022 09:00 AM    CREATININE 0.72 11/18/2022 09:00 AM          Pt is new to Xarelto, as Eliquis was not tolerated (severe nausea/vomiting w/ no other clear cause).   Discussed:   Indication for DOAC therapy.  Importance of monitoring and compliance.   Monitoring parameters, signs and symptoms of bleeding or clotting.  DOAC therapy, side effects, potential DDIs, OTC medications  Hormonal therapy Previous OAC  Pregnancy N/A  DDI N/A  Lifestyle safety, ie smoking, ETOH, hobby safety, fall safety/prevention  Procedures for missed doses or suspected missed doses, surgeries/procedures, travel, dental work, any medication changes  Discussed risks vs benefits of continuing three months of treatment versus stopping now, as patient reports the ongoing nausea/vomiting is interfering with daily life.  Discussed warfarin and dabigatran as potential other  anticoagulants if Xarelto is not tolerated.    Start with Xarelto 20mg taken once daily on evening 11/18    DOAC affordable = Coupon card provided    F/U - Length of Therapy Appointment on 12/28/2022    Debbie Whiting, PharmD

## 2022-11-20 ENCOUNTER — DOCUMENTATION (OUTPATIENT)
Dept: VASCULAR LAB | Facility: MEDICAL CENTER | Age: 47
End: 2022-11-20
Payer: COMMERCIAL

## 2022-11-21 NOTE — PROGRESS NOTES
Initial anticoag note and most recent ED notes reviewed.  Patient on oac for femoral dvt likely provoked by covid and OCP.    Pending further recs from pcp we will continue with 3 mo of oral anticoag and then consider d/c.     Will defer any indicated age appropriate screening for occult malignancy to pcp.    Michael Bloch, MD  Anticoagulation Clinic    Cc:    HELEN Haro

## 2022-11-22 ENCOUNTER — HOSPITAL ENCOUNTER (EMERGENCY)
Facility: MEDICAL CENTER | Age: 47
End: 2022-11-22
Attending: EMERGENCY MEDICINE
Payer: COMMERCIAL

## 2022-11-22 VITALS
SYSTOLIC BLOOD PRESSURE: 117 MMHG | WEIGHT: 108 LBS | HEIGHT: 65 IN | HEART RATE: 100 BPM | BODY MASS INDEX: 17.99 KG/M2 | OXYGEN SATURATION: 94 % | RESPIRATION RATE: 16 BRPM | TEMPERATURE: 97.6 F | DIASTOLIC BLOOD PRESSURE: 77 MMHG

## 2022-11-22 DIAGNOSIS — R10.13 EPIGASTRIC PAIN: ICD-10-CM

## 2022-11-22 DIAGNOSIS — R11.2 NAUSEA AND VOMITING, UNSPECIFIED VOMITING TYPE: ICD-10-CM

## 2022-11-22 LAB
ALBUMIN SERPL BCP-MCNC: 4.7 G/DL (ref 3.2–4.9)
ALBUMIN/GLOB SERPL: 1.6 G/DL
ALP SERPL-CCNC: 69 U/L (ref 30–99)
ALT SERPL-CCNC: 13 U/L (ref 2–50)
ANION GAP SERPL CALC-SCNC: 11 MMOL/L (ref 7–16)
AST SERPL-CCNC: 16 U/L (ref 12–45)
BASOPHILS # BLD AUTO: 0.5 % (ref 0–1.8)
BASOPHILS # BLD: 0.04 K/UL (ref 0–0.12)
BILIRUB SERPL-MCNC: 0.7 MG/DL (ref 0.1–1.5)
BUN SERPL-MCNC: 7 MG/DL (ref 8–22)
CALCIUM SERPL-MCNC: 9.9 MG/DL (ref 8.4–10.2)
CHLORIDE SERPL-SCNC: 103 MMOL/L (ref 96–112)
CO2 SERPL-SCNC: 26 MMOL/L (ref 20–33)
CREAT SERPL-MCNC: 0.67 MG/DL (ref 0.5–1.4)
EOSINOPHIL # BLD AUTO: 0.47 K/UL (ref 0–0.51)
EOSINOPHIL NFR BLD: 6.3 % (ref 0–6.9)
ERYTHROCYTE [DISTWIDTH] IN BLOOD BY AUTOMATED COUNT: 38.6 FL (ref 35.9–50)
GFR SERPLBLD CREATININE-BSD FMLA CKD-EPI: 110 ML/MIN/1.73 M 2
GLOBULIN SER CALC-MCNC: 2.9 G/DL (ref 1.9–3.5)
GLUCOSE SERPL-MCNC: 111 MG/DL (ref 65–99)
HCG SERPL QL: NEGATIVE
HCT VFR BLD AUTO: 44.1 % (ref 37–47)
HGB BLD-MCNC: 15.2 G/DL (ref 12–16)
IMM GRANULOCYTES # BLD AUTO: 0.05 K/UL (ref 0–0.11)
IMM GRANULOCYTES NFR BLD AUTO: 0.7 % (ref 0–0.9)
LACTATE SERPL-SCNC: 2.1 MMOL/L (ref 0.5–2)
LIPASE SERPL-CCNC: 80 U/L (ref 7–58)
LYMPHOCYTES # BLD AUTO: 1.49 K/UL (ref 1–4.8)
LYMPHOCYTES NFR BLD: 19.8 % (ref 22–41)
MAGNESIUM SERPL-MCNC: 2.1 MG/DL (ref 1.5–2.5)
MCH RBC QN AUTO: 31.4 PG (ref 27–33)
MCHC RBC AUTO-ENTMCNC: 34.5 G/DL (ref 33.6–35)
MCV RBC AUTO: 91.1 FL (ref 81.4–97.8)
MONOCYTES # BLD AUTO: 0.58 K/UL (ref 0–0.85)
MONOCYTES NFR BLD AUTO: 7.7 % (ref 0–13.4)
NEUTROPHILS # BLD AUTO: 4.88 K/UL (ref 2–7.15)
NEUTROPHILS NFR BLD: 65 % (ref 44–72)
NRBC # BLD AUTO: 0 K/UL
NRBC BLD-RTO: 0 /100 WBC
PLATELET # BLD AUTO: 263 K/UL (ref 164–446)
PMV BLD AUTO: 9.1 FL (ref 9–12.9)
POTASSIUM SERPL-SCNC: 3.8 MMOL/L (ref 3.6–5.5)
PROT SERPL-MCNC: 7.6 G/DL (ref 6–8.2)
RBC # BLD AUTO: 4.84 M/UL (ref 4.2–5.4)
SODIUM SERPL-SCNC: 140 MMOL/L (ref 135–145)
WBC # BLD AUTO: 7.5 K/UL (ref 4.8–10.8)

## 2022-11-22 PROCEDURE — 83690 ASSAY OF LIPASE: CPT

## 2022-11-22 PROCEDURE — 96374 THER/PROPH/DIAG INJ IV PUSH: CPT

## 2022-11-22 PROCEDURE — 85025 COMPLETE CBC W/AUTO DIFF WBC: CPT

## 2022-11-22 PROCEDURE — 83735 ASSAY OF MAGNESIUM: CPT

## 2022-11-22 PROCEDURE — 83605 ASSAY OF LACTIC ACID: CPT

## 2022-11-22 PROCEDURE — 96375 TX/PRO/DX INJ NEW DRUG ADDON: CPT

## 2022-11-22 PROCEDURE — 80053 COMPREHEN METABOLIC PANEL: CPT

## 2022-11-22 PROCEDURE — 99284 EMERGENCY DEPT VISIT MOD MDM: CPT

## 2022-11-22 PROCEDURE — 700105 HCHG RX REV CODE 258: Performed by: EMERGENCY MEDICINE

## 2022-11-22 PROCEDURE — 84703 CHORIONIC GONADOTROPIN ASSAY: CPT

## 2022-11-22 PROCEDURE — 700111 HCHG RX REV CODE 636 W/ 250 OVERRIDE (IP): Performed by: EMERGENCY MEDICINE

## 2022-11-22 PROCEDURE — 36415 COLL VENOUS BLD VENIPUNCTURE: CPT

## 2022-11-22 RX ORDER — DIPHENHYDRAMINE HYDROCHLORIDE 50 MG/ML
50 INJECTION INTRAMUSCULAR; INTRAVENOUS ONCE
Status: COMPLETED | OUTPATIENT
Start: 2022-11-22 | End: 2022-11-22

## 2022-11-22 RX ORDER — SODIUM CHLORIDE 9 MG/ML
1000 INJECTION, SOLUTION INTRAVENOUS ONCE
Status: COMPLETED | OUTPATIENT
Start: 2022-11-22 | End: 2022-11-22

## 2022-11-22 RX ORDER — METOCLOPRAMIDE 5 MG/1
5 TABLET ORAL 4 TIMES DAILY
Qty: 20 TABLET | Refills: 0 | Status: SHIPPED | OUTPATIENT
Start: 2022-11-22 | End: 2022-11-27

## 2022-11-22 RX ORDER — PROMETHAZINE HYDROCHLORIDE 25 MG/1
25 SUPPOSITORY RECTAL EVERY 6 HOURS PRN
Qty: 12 SUPPOSITORY | Refills: 0 | Status: SHIPPED | OUTPATIENT
Start: 2022-11-22 | End: 2022-11-27

## 2022-11-22 RX ORDER — METOCLOPRAMIDE HYDROCHLORIDE 5 MG/ML
10 INJECTION INTRAMUSCULAR; INTRAVENOUS ONCE
Status: COMPLETED | OUTPATIENT
Start: 2022-11-22 | End: 2022-11-22

## 2022-11-22 RX ADMIN — SODIUM CHLORIDE 1000 ML: 9 INJECTION, SOLUTION INTRAVENOUS at 04:52

## 2022-11-22 RX ADMIN — DIPHENHYDRAMINE HYDROCHLORIDE 50 MG: 50 INJECTION INTRAMUSCULAR; INTRAVENOUS at 04:49

## 2022-11-22 RX ADMIN — METOCLOPRAMIDE 10 MG: 5 INJECTION, SOLUTION INTRAMUSCULAR; INTRAVENOUS at 04:49

## 2022-11-22 ASSESSMENT — FIBROSIS 4 INDEX
FIB4 SCORE: 0.78
FIB4 SCORE: 0.78

## 2022-11-22 NOTE — ED PROVIDER NOTES
ED Provider Note    CHIEF COMPLAINT  Chief Complaint   Patient presents with    Abdominal Pain     46 y/o female presents with a chief complaint of abdominal pain and n/v/d x1 month. Patient stated that she has attempted to self treat with a bland diet, and taking omeprazole without relief. Patient stated that she has also been having diarrhea. Patient denied any fevers, chills, or constipation.     N/V    Diarrhea       HPI  Karis Gerardo is a 47 y.o. female who presents for evaluation of continued nausea and vomiting.  Patient notes the nausea has been present and consistent for about a month and she has had 4 ED visits.  She wonders if it is related to her anticoagulation which she started in September after a provoked DVT in the left lower extremity presumably from exogenous hormones.  Patient is on her last few weeks and has recently switched to Xarelto for anticoagulation.  She still continues to be nauseous and vomiting and she feels as if she is losing weight.  She notes that every time she eats something she feels like she is going to vomit.  No fevers or chills and no particular chest pain or shortness of breath.  She does note that she occasionally sees small amounts of blood streaked in with the vomit but has had no coffee-ground emesis or hematochezia/melena.    REVIEW OF SYSTEMS  Constitutional: No fevers or chills  Skin: No rashes  HEENT: No sore throat, runny nose, sores, trouble swallowing, trouble speaking.  Chest: No pain or rashes  Pulm: No shortness of breath, cough, wheezing, stridor, or pain with inspiration/expiration  Gastrointestinal: Diarrhea yesterday.  None today.  No constipation, bloating, melena, or hematochezia.  Genitourinary: No dysuria or hematuria  Musculoskeletal: No pain, swelling, weakness  Neurologic: No sensory or motor changes. No confusion or disorientation.  Heme: Bruises and bleeds easily due to anticoagulation.  Immuno: No hx of recurrent infections    PAST FAM  "HISTORY  Family History   Problem Relation Age of Onset    Hypertension Father     Hyperlipidemia Sister        PAST MEDICAL HISTORY   has a past medical history of Patient denies medical problems.    SOCIAL HISTORY  Social History     Tobacco Use    Smoking status: Never    Smokeless tobacco: Never   Vaping Use    Vaping Use: Never used   Substance and Sexual Activity    Alcohol use: Yes    Drug use: No    Sexual activity: Not on file       SURGICAL HISTORY   has a past surgical history that includes nasal reconstruction.    CURRENT MEDICATIONS  Home Medications       Reviewed by Amina Link R.N. (Registered Nurse) on 11/22/22 at 0414  Med List Status: Not Addressed     Medication Last Dose Status   omeprazole (PRILOSEC) 40 MG delayed-release capsule  Active   ondansetron (ZOFRAN ODT) 4 MG TABLET DISPERSIBLE  Active   rivaroxaban (XARELTO) 20 MG Tab tablet  Active   therapeutic multivitamin-minerals (THERAGRAN-M) Tab  Active                    ALLERGIES  Allergies   Allergen Reactions    Pcn [Penicillins] Hives     RXN=as a child    Sulfa Drugs Hives     RXN=as a child       PHYSICAL EXAM  VITAL SIGNS: BP (!) 144/97   Pulse 86   Temp 36.4 °C (97.5 °F) (Oral)   Resp 16   Ht 1.651 m (5' 5\")   Wt 49 kg (108 lb)   LMP 09/30/2022 Comment: Patient stated perimenopausal  SpO2 94%   BMI 17.97 kg/m²    Gen: Appears tired otherwise calm.  HEENT: No signs of trauma, Bilateral external ears normal, Nose normal. Conjunctiva normal, Non-icteric.   Neck:  No tenderness, Supple, No masses  Lymphatic: No cervical lymphadenopathy noted.   Cardiovascular: Regular rate and rhythm, no murmurs.  Capillary refill less than 3 seconds to all extremities, 2+ distal pulses.  Thorax & Lungs: Normal breath sounds, No respiratory distress, No wheezing bilateral chest rise  Abdomen: Bowel sounds normal, Soft, mild diffuse tenderness to the left upper quadrant. No masses, No pulsatile masses. No Guarding or rebound  Skin: Warm, Dry, " No erythema, No rash noted to exposed areas.    Extremities: Intact distal pulses, No edema  Neurologic: Alert , no facial droop, grossly normal coordination and strength  Psychiatric: Affect pleasant    LABS  Results for orders placed or performed during the hospital encounter of 11/22/22   CBC WITH DIFFERENTIAL   Result Value Ref Range    WBC 7.5 4.8 - 10.8 K/uL    RBC 4.84 4.20 - 5.40 M/uL    Hemoglobin 15.2 12.0 - 16.0 g/dL    Hematocrit 44.1 37.0 - 47.0 %    MCV 91.1 81.4 - 97.8 fL    MCH 31.4 27.0 - 33.0 pg    MCHC 34.5 33.6 - 35.0 g/dL    RDW 38.6 35.9 - 50.0 fL    Platelet Count 263 164 - 446 K/uL    MPV 9.1 9.0 - 12.9 fL    Neutrophils-Polys 65.00 44.00 - 72.00 %    Lymphocytes 19.80 (L) 22.00 - 41.00 %    Monocytes 7.70 0.00 - 13.40 %    Eosinophils 6.30 0.00 - 6.90 %    Basophils 0.50 0.00 - 1.80 %    Immature Granulocytes 0.70 0.00 - 0.90 %    Nucleated RBC 0.00 /100 WBC    Neutrophils (Absolute) 4.88 2.00 - 7.15 K/uL    Lymphs (Absolute) 1.49 1.00 - 4.80 K/uL    Monos (Absolute) 0.58 0.00 - 0.85 K/uL    Eos (Absolute) 0.47 0.00 - 0.51 K/uL    Baso (Absolute) 0.04 0.00 - 0.12 K/uL    Immature Granulocytes (abs) 0.05 0.00 - 0.11 K/uL    NRBC (Absolute) 0.00 K/uL   COMP METABOLIC PANEL   Result Value Ref Range    Sodium 140 135 - 145 mmol/L    Potassium 3.8 3.6 - 5.5 mmol/L    Chloride 103 96 - 112 mmol/L    Co2 26 20 - 33 mmol/L    Anion Gap 11.0 7.0 - 16.0    Glucose 111 (H) 65 - 99 mg/dL    Bun 7 (L) 8 - 22 mg/dL    Creatinine 0.67 0.50 - 1.40 mg/dL    Calcium 9.9 8.4 - 10.2 mg/dL    AST(SGOT) 16 12 - 45 U/L    ALT(SGPT) 13 2 - 50 U/L    Alkaline Phosphatase 69 30 - 99 U/L    Total Bilirubin 0.7 0.1 - 1.5 mg/dL    Albumin 4.7 3.2 - 4.9 g/dL    Total Protein 7.6 6.0 - 8.2 g/dL    Globulin 2.9 1.9 - 3.5 g/dL    A-G Ratio 1.6 g/dL   LACTIC ACID   Result Value Ref Range    Lactic Acid 2.1 (H) 0.5 - 2.0 mmol/L   LIPASE   Result Value Ref Range    Lipase 80 (H) 7 - 58 U/L   MAGNESIUM   Result Value Ref Range     Magnesium 2.1 1.5 - 2.5 mg/dL   ESTIMATED GFR   Result Value Ref Range    GFR (CKD-EPI) 110 >60 mL/min/1.73 m 2             COURSE & MEDICAL DECISION MAKING  Patient arrives for evaluation of what appears to be continued nausea and vomiting which has been present for a month.  She does not appear septic or toxic but is quite distressed about the chronicity of her symptoms.  She states understanding that we will attempt rehydration via IV as well as Benadryl and Reglan for her nausea as Zofran has not worked at home.  She also states understanding the need for repeat laboratory evaluation to evaluate for endorgan dysfunction.  Unclear if imaging will be necessary however I doubt it at this time.  She does not have peritoneal signs and does not appear septic or toxic.    6 AM  Patient no longer feels nauseous and is able to tolerate p.o. fluids.  She states understanding that her labs are reassuring and that there is no indication of an emergent issue.  Her lactate is slightly elevated but is likely improved given the fluid she has been given.  Likewise, her lipase was mildly elevated however it does not indicate severe pancreatitis and she has normal liver enzymes otherwise.  She states understanding of empiric treatment at home with antiemetics is reasonable and I do not feel further imaging will benefit her  today.  She will need to follow-up with her primary care physician as the symptoms are fairly persistent and may require further outpatient expert consultation, possibly with GI.  She will continue her Prilosec and will be given 2 different options for antiemetics at home including Reglan and Phenergan suppositories.  She will return if her symptoms worsen or change in any way.    FINAL IMPRESSION  1. Nausea and vomiting, unspecified vomiting type    2. Epigastric pain        Electronically signed by: Stef Bradford M.D., 11/22/2022 4:10 AM

## 2022-11-22 NOTE — ED TRIAGE NOTES
"Chief Complaint   Patient presents with    Abdominal Pain     46 y/o female presents with a chief complaint of abdominal pain and n/v/d x1 month. Patient stated that she has attempted to self treat with a bland diet, and taking omeprazole without relief. Patient stated that she has also been having diarrhea. Patient denied any fevers, chills, or constipation.     N/V    Diarrhea     BP (!) 128/93   Pulse 96   Temp 36.4 °C (97.5 °F) (Oral)   Resp 18   Ht 1.651 m (5' 5\")   Wt 49 kg (108 lb)   LMP 09/30/2022 Comment: Patient stated perimenopausal  SpO2 96%   BMI 17.97 kg/m²     "

## 2022-11-22 NOTE — DISCHARGE PLANNING
note:  Received a call from pt who said she is unable to get an appt with Dr. Robb's office until all the notes are completed.     CM completed a chart review and as far as CM can tell, notes have been completed.

## 2022-11-22 NOTE — DISCHARGE PLANNING
note:  CM called Jenna at Dr. Haro's office and she gave pt an appt for 1015 am tomorrow. Pt notified.

## 2022-11-22 NOTE — ED NOTES
Patient presents to the ED for N/V, loose stools, and throat pain. Patient states that she has been experiencing n/v for a month, with no relief. Patient states that she was diagnosed with a DVT in her left leg and was treated for it in September of 2022. Patient states that she experienced bright red blood in her emesis on 11-18. Patient states that she does have emesis that is red off and on when she vomits. Patient states that she has been experiencing light colored loose stools for the past two weeks. Patient denies chest pain and shortness of breath at this time.

## 2022-12-08 ENCOUNTER — ANTICOAGULATION VISIT (OUTPATIENT)
Dept: VASCULAR LAB | Facility: MEDICAL CENTER | Age: 47
End: 2022-12-08
Attending: INTERNAL MEDICINE
Payer: COMMERCIAL

## 2022-12-08 VITALS — DIASTOLIC BLOOD PRESSURE: 75 MMHG | SYSTOLIC BLOOD PRESSURE: 110 MMHG | HEART RATE: 87 BPM

## 2022-12-08 DIAGNOSIS — I82.412 ACUTE DEEP VEIN THROMBOSIS (DVT) OF FEMORAL VEIN OF LEFT LOWER EXTREMITY (HCC): ICD-10-CM

## 2022-12-08 PROBLEM — I82.409 DEEP VEIN THROMBOSIS (HCC): Status: RESOLVED | Noted: 2022-10-06 | Resolved: 2022-12-08

## 2022-12-08 PROCEDURE — 99212 OFFICE O/P EST SF 10 MIN: CPT | Performed by: NURSE PRACTITIONER

## 2022-12-08 PROCEDURE — 99214 OFFICE O/P EST MOD 30 MIN: CPT | Performed by: NURSE PRACTITIONER

## 2022-12-08 RX ORDER — SUCRALFATE 1 G/1
1 TABLET ORAL
COMMUNITY
End: 2024-03-07

## 2022-12-08 RX ORDER — PANTOPRAZOLE SODIUM 40 MG/1
40 TABLET, DELAYED RELEASE ORAL 2 TIMES DAILY
COMMUNITY
End: 2024-03-07

## 2022-12-08 ASSESSMENT — ENCOUNTER SYMPTOMS
WEIGHT LOSS: 1
SHORTNESS OF BREATH: 0
BRUISES/BLEEDS EASILY: 0
VOMITING: 1
LOSS OF CONSCIOUSNESS: 0
HEMOPTYSIS: 0
ABDOMINAL PAIN: 1
SEIZURES: 0
BLOOD IN STOOL: 0
NAUSEA: 1

## 2022-12-08 NOTE — PROGRESS NOTES
VASCULAR MEDICINE CLINIC - INITIAL VISIT (ANTICOAGULATION)  12/08/22     Karis Gerardo is a 47 y.o. female who presents today for evaluation of LOT in regards to her anticoagulation.     Subjective    HPI:  Patient referred for evaluation and management of anticoagulation therapy.  She presented to the ER on 9/27/22 with LLE swelling and patches of numbness to her face and lower back.  U/s showed a left femoral DVT and thrombosis to her GSV.  Head CT and CT angiogram of neck neg.  Denies any prior hx of VTE.  Her grandmother had a hx of a DVT in her later years and had an IVC filter placed.  Was taking Belle birth control at the time for menopausal symptoms.  No longer taking.  Pt had a covid infection about 2 months prior.  Was not hospitalized but reports being sick for about a week.  No recent surgeries, traumas, extended travel or other prolonged periods of immobility.  Does not use tobacco.  No personal hx of cancer.  Is up to date on her mammogram and pap smear.  She was prescribed Xarelto in the ER but started Eliquis 10 mg BID x 7 days after she saw her PCP.  Continued on to Eliquis 5 mg BID until about 11/17/22 when she was switched to Xarelto 20 mg daily due to severe nausea and vomiting while taking Eliquis.   Also noted yellow/pale stools while taking Eliquis.  Was in the ER on 10/31/22 for increased LLE swelling and pain and n/v.  Repeat LLE u/s showed resolution of the DVT.  She returned to the ER on 11/18/22 with bright red blood in her stool and emesis due to suspected gastritis and rectal fissure.  Again returned to the ER on 11/22/22 for abd pain, n/v/d.   Given IVF and zofran.  Lipase 80.  Continues to have n/v and is consuming ~300 calories per day.  Is able to keep liquids down but her caloric intake is reduced which is concerning for her.  Reports a 15 lb weight loss since starting OAC.  Continued Xarelto until she saw her PCP on Tuesday and OAC stopped due to pt symptoms.  Supposed to start   mg daily but pt anxious about potential GI upset. Asking about [place] form.  Had severe heartburn and persistent n/v on Xarelto.  Partial hypercoag w/u obtained which was essentially normal per patient.  She follows with Dr Higginbotham from GI.   No further LLE pain. Occasional feels a twinge to her calf.  Has small amount of swelling to her left ankle.  No SOB or CP.  No further bleeding.  Still with n/v, poor appetite.  Completed ~10 weeks of OAC.    Past Medical History:   Diagnosis Date    Patient denies medical problems         Past Surgical History:   Procedure Laterality Date    NASAL RECONSTRUCTION          Family History   Problem Relation Age of Onset    Hypertension Father     Hyperlipidemia Sister         Social History     Tobacco Use    Smoking status: Never    Smokeless tobacco: Never   Vaping Use    Vaping Use: Never used   Substance Use Topics    Alcohol use: Yes    Drug use: No        Current Outpatient Medications on File Prior to Visit   Medication Sig Dispense Refill    pantoprazole (PROTONIX) 40 MG Tablet Delayed Response Take 40 mg by mouth 2 times a day.      sucralfate (CARAFATE) 1 GM Tab Take 1 g by mouth 4 Times a Day,Before Meals and at Bedtime.      ondansetron (ZOFRAN ODT) 4 MG TABLET DISPERSIBLE Take 1 Tablet by mouth every 6 hours as needed for Nausea. 10 Tablet 0     No current facility-administered medications on file prior to visit.     Allergies:  Pcn [penicillins] and Sulfa drugs     DIET AND EXERCISE:  Weight Change: lost 15 lbs since Sept  Diet: bland diet  Exercise: moderate regular exercise program     Review of Systems   Constitutional:  Positive for weight loss.   Respiratory:  Negative for hemoptysis and shortness of breath.    Cardiovascular:  Positive for leg swelling. Negative for chest pain.   Gastrointestinal:  Positive for abdominal pain, nausea and vomiting. Negative for blood in stool and melena.   Genitourinary:  Negative for hematuria.   Neurological:  Negative for  seizures and loss of consciousness.   Endo/Heme/Allergies:  Does not bruise/bleed easily.          Objective       Objective:     Vitals:    12/08/22 1446   BP: 110/75   Pulse: 87        Physical Exam  Constitutional:       Appearance: Normal appearance. She is normal weight.   Cardiovascular:      Rate and Rhythm: Normal rate and regular rhythm.      Heart sounds: Normal heart sounds.   Pulmonary:      Effort: Pulmonary effort is normal.      Breath sounds: Normal breath sounds.   Abdominal:      General: Abdomen is flat. Bowel sounds are normal.   Musculoskeletal:         General: No tenderness.      Left lower leg: Edema present.      Comments: Slight swelling to left ankle. No calf tenderness or swelling. Barron's neg.   Neurological:      Mental Status: She is alert.        DATA REVIEW    No results found for: CHOLSTRLTOT, LDL, HDL, TRIGLYCERIDE    Lab Results   Component Value Date/Time    SODIUM 140 11/22/2022 04:29 AM    POTASSIUM 3.8 11/22/2022 04:29 AM    CHLORIDE 103 11/22/2022 04:29 AM    CO2 26 11/22/2022 04:29 AM    GLUCOSE 111 (H) 11/22/2022 04:29 AM    BUN 7 (L) 11/22/2022 04:29 AM    CREATININE 0.67 11/22/2022 04:29 AM     Lab Results   Component Value Date/Time    ALKPHOSPHAT 69 11/22/2022 04:29 AM    ASTSGOT 16 11/22/2022 04:29 AM    ALTSGPT 13 11/22/2022 04:29 AM    TBILIRUBIN 0.7 11/22/2022 04:29 AM       INR   Date Value Ref Range Status   09/27/2022 0.96 0.87 - 1.13 Final     Comment:     Reference range:  INR - Non-therapeutic Reference Range: 0.87-1.13  INR - Therapeutic Reference Range: 2.0-4.0       No results found for: POCINR     CTA head 9/27/22:  IMPRESSION:    1.  No large vessel occlusion or aneurysm identified    CTA neck 9/27/22:  IMPRESSION:   1.  CT angiogram of the neck within normal limits.    LLE venous duplex 9/27/22:  IMPRESSION:  1.  Acute thrombosis which is not completely occlusive is identified in deep and superficial veins of the left lower extremity.    Nonocclusive  thrombus is noted in the left common femoral vein, distal femoral vein, and popliteal vein. Thrombus is also noted in the proximal greater saphenous vein as well as greater saphenous vein at the knee. These veins are noncompressible. No   thrombosis is identified in the proximal and mid femoral vein.     LLE venous duplex 10/31/22:   No superficial or deep venous thrombosis.    Medical Decision Making:  Today's Assessment / Status / Plan:     1. Acute deep vein thrombosis (DVT) of femoral vein of left lower extremity (HCC)             Indication for anticoagulation: LLE femoral DVT while taking OCP    Anti-Platelet/Anticoagulant Discussion:  Discussed the risks of stopping OAC in this setting. Her DVT was extensive. Occurred in the setting of OCP use which she no longer takes. Was not able to complete a minimum of 3 months of therapy per ACCP guidelines due to ongoing n/v however, her repeat u/s was neg. Completed about 10 weeks of therapy. Partial hypercoag labs neg (per patient; will request from lab [facility]). Discussed obtaining protein c and s, ATIII and LA now that she is off OAC. After much discussion, with patient preference and shared decision-making, we will not reinitiate OAC therapy. Recommend aspirin 162 mg if she can tolerate. She asking about a supp form which I will look into. She is willing to do remaining hypercoag labs, however, she think she needs a PA which her PCP's office is working on. Stressed the importance of close surveillance for s/sx of recurrent VTE and to make sure all her providers know she has a hx of DVT, kassandra if having any surgeries or hospitalizations. Continue to avoid tobacco, hormones and prolonged sedentary periods.    Anti-Coagulation Plan:  - go to the ER for shortness of breath, chest pain, pain with deep inhalation, worsening leg swelling and/or pain in calf or leg   - avoid sedentary periods  - continue complete avoidance of tobacco products  - avoid hormonal therapies  including estrogen or testosterone-containing meds, or raloxifene or tamoxifene (commonly used for osteoporosis)  - elevate legs as much as possible, use compression stockings/socks if directed by your provider  - if having any invasive procedure or hospitalization, please make sure the doctor knows of your a history of blood clot  - keep up with age-appropriate cancer screenings as a small % of blood clots may be caused by an underlying malignancy    Smoking: continue complete avoidance of all tobacco products    Physical Activity: goal is 3-4 days as tolerated    Weight Management and Nutrition: bland, high calorie or per GI    Instructed to follow-up with PCP for remainder of adult medical needs: yes  We will partner with other provider in the management of established vascular disease and cardiometabolic risk factors    Studies to Be Obtained: none  Labs to Be Obtained: further hypercoag labs per PCP    Follow up in 1 week by phone; cont follow up with PCP and GI    Nevin STODDARD    Cc:    Dr Salcedo

## 2022-12-08 NOTE — PATIENT INSTRUCTIONS
- go to the ER for shortness of breath, chest pain, pain with deep inhalation, worsening leg swelling and/or pain in calf or leg   - avoid sedentary periods  - continue complete avoidance of tobacco products  - avoid hormonal therapies including estrogen or testosterone-containing meds, or raloxifene or tamoxifene (commonly used for osteoporosis)  - elevate legs as much as possible, use compression stockings/socks if directed by your provider  - if having any invasive procedure or hospitalization, please make sure the doctor knows of your a history of blood clot  - keep up with age-appropriate cancer screenings as a small % of blood clots may be caused by an underlying malignancy

## 2022-12-15 ENCOUNTER — ANTICOAGULATION MONITORING (OUTPATIENT)
Dept: VASCULAR LAB | Facility: MEDICAL CENTER | Age: 47
End: 2022-12-15
Payer: COMMERCIAL

## 2022-12-15 NOTE — PROGRESS NOTES
Discussed care with Dr Salcedo and he will continue to manage her. Spoke with pt by phone. She is doing much better. No longer has n/v. Still has some abd pain but much improved. She will continue to [facility] at [facility]. Encouraged to call for any questions or concerns. Will discharge from [facility] Anticoag clinic.    Nevin STODDARD

## 2022-12-28 ENCOUNTER — APPOINTMENT (OUTPATIENT)
Dept: VASCULAR LAB | Facility: MEDICAL CENTER | Age: 47
End: 2022-12-28
Attending: INTERNAL MEDICINE
Payer: COMMERCIAL

## 2023-01-09 ENCOUNTER — APPOINTMENT (OUTPATIENT)
Dept: RADIOLOGY | Facility: MEDICAL CENTER | Age: 48
End: 2023-01-09
Attending: INTERNAL MEDICINE
Payer: COMMERCIAL

## 2023-10-06 ENCOUNTER — HOSPITAL ENCOUNTER (OUTPATIENT)
Dept: RADIOLOGY | Facility: MEDICAL CENTER | Age: 48
End: 2023-10-06
Attending: STUDENT IN AN ORGANIZED HEALTH CARE EDUCATION/TRAINING PROGRAM
Payer: COMMERCIAL

## 2023-10-06 DIAGNOSIS — O22.30 DEEP PHLEBOTHROMBOSIS, ANTEPARTUM, WITH DELIVERY (HCC): ICD-10-CM

## 2023-10-06 DIAGNOSIS — I82.409 DEEP PHLEBOTHROMBOSIS, ANTEPARTUM, WITH DELIVERY (HCC): ICD-10-CM

## 2023-10-06 PROCEDURE — 93970 EXTREMITY STUDY: CPT

## 2023-11-06 ENCOUNTER — HOSPITAL ENCOUNTER (OUTPATIENT)
Dept: RADIOLOGY | Facility: MEDICAL CENTER | Age: 48
End: 2023-11-06
Attending: STUDENT IN AN ORGANIZED HEALTH CARE EDUCATION/TRAINING PROGRAM
Payer: COMMERCIAL

## 2023-11-06 DIAGNOSIS — M79.642 LEFT HAND PAIN: ICD-10-CM

## 2023-11-06 PROCEDURE — 73110 X-RAY EXAM OF WRIST: CPT | Mod: LT

## 2023-11-27 PROBLEM — S63.502A SPRAIN OF LEFT WRIST: Status: ACTIVE | Noted: 2023-11-27

## 2023-12-01 ENCOUNTER — HOSPITAL ENCOUNTER (OUTPATIENT)
Dept: LAB | Facility: MEDICAL CENTER | Age: 48
End: 2023-12-01
Attending: OBSTETRICS & GYNECOLOGY
Payer: COMMERCIAL

## 2023-12-01 PROCEDURE — 88175 CYTOPATH C/V AUTO FLUID REDO: CPT

## 2023-12-06 ENCOUNTER — APPOINTMENT (OUTPATIENT)
Dept: RADIOLOGY | Facility: MEDICAL CENTER | Age: 48
End: 2023-12-06
Attending: OBSTETRICS & GYNECOLOGY
Payer: COMMERCIAL

## 2023-12-06 LAB
COMMENT NL11729A: NORMAL
CYTOLOGIST CVX/VAG CYTO: NORMAL
CYTOLOGY CVX/VAG DOC CYTO: NORMAL
CYTOLOGY CVX/VAG DOC THIN PREP: NORMAL
NOTE NL11727A: NORMAL
OTHER STN SPEC: NORMAL
STAT OF ADQ CVX/VAG CYTO-IMP: NORMAL

## 2024-01-04 ENCOUNTER — APPOINTMENT (RX ONLY)
Dept: URBAN - METROPOLITAN AREA CLINIC 15 | Facility: CLINIC | Age: 49
Setting detail: DERMATOLOGY
End: 2024-01-04

## 2024-01-04 DIAGNOSIS — L81.3 CAFÉ AU LAIT SPOTS: ICD-10-CM

## 2024-01-04 DIAGNOSIS — L82.1 OTHER SEBORRHEIC KERATOSIS: ICD-10-CM

## 2024-01-04 DIAGNOSIS — D18.0 HEMANGIOMA: ICD-10-CM

## 2024-01-04 DIAGNOSIS — Z71.89 OTHER SPECIFIED COUNSELING: ICD-10-CM

## 2024-01-04 DIAGNOSIS — L81.4 OTHER MELANIN HYPERPIGMENTATION: ICD-10-CM

## 2024-01-04 DIAGNOSIS — D22 MELANOCYTIC NEVI: ICD-10-CM

## 2024-01-04 PROBLEM — D22.5 MELANOCYTIC NEVI OF TRUNK: Status: ACTIVE | Noted: 2024-01-04

## 2024-01-04 PROBLEM — D18.01 HEMANGIOMA OF SKIN AND SUBCUTANEOUS TISSUE: Status: ACTIVE | Noted: 2024-01-04

## 2024-01-04 PROBLEM — D22.71 MELANOCYTIC NEVI OF RIGHT LOWER LIMB, INCLUDING HIP: Status: ACTIVE | Noted: 2024-01-04

## 2024-01-04 PROCEDURE — 99203 OFFICE O/P NEW LOW 30 MIN: CPT

## 2024-01-04 PROCEDURE — ? COUNSELING

## 2024-01-04 PROCEDURE — ? SUNSCREEN RECOMMENDATIONS

## 2024-01-04 ASSESSMENT — LOCATION DETAILED DESCRIPTION DERM
LOCATION DETAILED: RIGHT PROXIMAL CALF
LOCATION DETAILED: LEFT PROXIMAL DORSAL FOREARM
LOCATION DETAILED: RIGHT SUPERIOR FLANK
LOCATION DETAILED: LEFT ANTERIOR PROXIMAL THIGH
LOCATION DETAILED: RIGHT MEDIAL INFERIOR CHEST
LOCATION DETAILED: RIGHT SUPERIOR MEDIAL UPPER BACK

## 2024-01-04 ASSESSMENT — LOCATION SIMPLE DESCRIPTION DERM
LOCATION SIMPLE: LEFT FOREARM
LOCATION SIMPLE: RIGHT UPPER BACK
LOCATION SIMPLE: RIGHT CALF
LOCATION SIMPLE: CHEST
LOCATION SIMPLE: ABDOMEN
LOCATION SIMPLE: LEFT THIGH

## 2024-01-04 ASSESSMENT — LOCATION ZONE DERM
LOCATION ZONE: LEG
LOCATION ZONE: TRUNK
LOCATION ZONE: ARM

## 2024-01-04 NOTE — PROCEDURE: MIPS QUALITY
Detail Level: Simple
Quality 130: Documentation Of Current Medications In The Medical Record: Current Medications Documented
Quality 402: Tobacco Use And Help With Quitting Among Adolescents: Patient screened for tobacco and never smoked
Quality 431: Preventive Care And Screening: Unhealthy Alcohol Use - Screening: Patient not identified as an unhealthy alcohol user when screened for unhealthy alcohol use using a systematic screening method
Quality 226: Preventive Care And Screening: Tobacco Use: Screening And Cessation Intervention: Patient screened for tobacco use and is an ex/non-smoker

## 2024-01-30 ENCOUNTER — HOSPITAL ENCOUNTER (OUTPATIENT)
Dept: RADIOLOGY | Facility: MEDICAL CENTER | Age: 49
End: 2024-01-30
Attending: STUDENT IN AN ORGANIZED HEALTH CARE EDUCATION/TRAINING PROGRAM
Payer: COMMERCIAL

## 2024-01-30 DIAGNOSIS — R92.30 DENSE BREASTS: ICD-10-CM

## 2024-01-30 DIAGNOSIS — R92.2 DENSE BREASTS: ICD-10-CM

## 2024-01-30 PROCEDURE — 76641 ULTRASOUND BREAST COMPLETE: CPT

## 2024-02-02 ENCOUNTER — HOSPITAL ENCOUNTER (OUTPATIENT)
Dept: RADIOLOGY | Facility: MEDICAL CENTER | Age: 49
End: 2024-02-02
Attending: OBSTETRICS & GYNECOLOGY
Payer: COMMERCIAL

## 2024-02-02 DIAGNOSIS — Z87.39 PERSONAL HISTORY OF ARTHRITIS: ICD-10-CM

## 2024-02-02 PROCEDURE — 77080 DXA BONE DENSITY AXIAL: CPT

## 2024-02-09 ENCOUNTER — APPOINTMENT (OUTPATIENT)
Dept: RADIOLOGY | Facility: MEDICAL CENTER | Age: 49
End: 2024-02-09
Attending: STUDENT IN AN ORGANIZED HEALTH CARE EDUCATION/TRAINING PROGRAM
Payer: COMMERCIAL

## 2024-02-09 DIAGNOSIS — Z12.31 VISIT FOR SCREENING MAMMOGRAM: ICD-10-CM

## 2024-02-09 PROCEDURE — 77067 SCR MAMMO BI INCL CAD: CPT

## 2024-03-07 ENCOUNTER — OFFICE VISIT (OUTPATIENT)
Dept: NEUROLOGY | Facility: MEDICAL CENTER | Age: 49
End: 2024-03-07
Attending: PSYCHIATRY & NEUROLOGY
Payer: COMMERCIAL

## 2024-03-07 VITALS
SYSTOLIC BLOOD PRESSURE: 108 MMHG | HEIGHT: 65 IN | HEART RATE: 83 BPM | BODY MASS INDEX: 19.32 KG/M2 | TEMPERATURE: 98.3 F | DIASTOLIC BLOOD PRESSURE: 64 MMHG | OXYGEN SATURATION: 95 % | WEIGHT: 115.96 LBS

## 2024-03-07 DIAGNOSIS — R20.2 PARESTHESIAS: Primary | ICD-10-CM

## 2024-03-07 PROCEDURE — 99211 OFF/OP EST MAY X REQ PHY/QHP: CPT | Performed by: PSYCHIATRY & NEUROLOGY

## 2024-03-07 PROCEDURE — 99205 OFFICE O/P NEW HI 60 MIN: CPT | Performed by: PSYCHIATRY & NEUROLOGY

## 2024-03-07 ASSESSMENT — ENCOUNTER SYMPTOMS
FOCAL WEAKNESS: 0
SPEECH CHANGE: 0
FALLS: 0
LOSS OF CONSCIOUSNESS: 0
DIZZINESS: 0
TREMORS: 0
MEMORY LOSS: 0
TINGLING: 1
CONSTIPATION: 0
HEADACHES: 0

## 2024-03-07 ASSESSMENT — PATIENT HEALTH QUESTIONNAIRE - PHQ9: CLINICAL INTERPRETATION OF PHQ2 SCORE: 0

## 2024-03-07 ASSESSMENT — FIBROSIS 4 INDEX: FIB4 SCORE: 0.78

## 2024-03-07 NOTE — PROGRESS NOTES
Javier Gerardo is a 48 y.o. female who presents with her  Franky, from the office of Homero Valadez D.P.M., for consultation, with a history of fluctuating paresthesias involving left side of the face, left upper extremity, and left lower extremity dating back to September, 2022.     LUKE Dyson is a very pleasant 48-year-old right-handed woman who began to notice an episodic feeling of numbness involving the left side of the face in its entirety.  Random in onset, the symptoms would last for couple of days and then resolved without sequelae.  At the time of the first event, she had been undergoing evaluation for what turned out to be was a DVT.  Treated as possible stroke, CT of the brain, CTA of the brain and neck were all done, I reviewed the images, these were normal.  She was placed on anticoagulation because of the DVT in any case.  The symptoms resolved.  There were no other associated symptoms of facial weakness, slurred speech, headache, dizziness, vertigo, ipsilateral autonomic symptoms, etc.    Since then, the episodes occur maybe 2 or 3 times in a month, no consistent triggers or provoking circumstance, seemingly with a mind of their own.  They were last 1 or 2 days, complete resolution until recurrence.    In early 2023, the left upper extremity became involved in similar pattern, there was a numbness involving the entire arm, independent of the facial sensory distortion.  The arm is not painful, there is no neck pain with radiation, there was no loss of strength or dexterity with the hand and fingers.  She cannot bring an episode on with head or neck movement or arm position.  Symptoms would last for couple of days, resolved without sequelae, these occur maybe once a month on average.  They also occur independent of the facial episodes.  There were never sequelae.  In the fall of last year, she then fell on the left arm, describes significant pain and swelling in the  ulnar nerve pattern from the hand up the medial forearm to the elbow.  Treated as a musculoskeletal insult, with physical therapy, symptoms improved notably.  Even with this insult, there was no change in the upper extremity global paresthesias.    The left foot became involved towards the end of 2022 soon after her face symptoms began.  She described a shooting pain and tingling with numbness over the medial aspect of the left foot, around the ankle and along the sole.  It was constant, no change with position or weightbearing.  There is no back pain.  There was no sudden increase in the left upper extremity and facial paresthesias.    Treatment for the left foot eventually involved physical therapy is beginning late last year.  She cannot take NSAIDs because of GI intolerance.  EMG was done in January of this year, just of the left lower extremity, and this was completely normal.  MRI of the ankle also from January of this year evidently was were unremarkable.  The possibility of a focal cyst approximating the medial ankle was considered the likely cause initially, she was given a local steroid injection which helped symptoms notably improved.  She is now left simply with numbness along the medial foot beneath the ankle and distal to the ball of the foot.    With all of the above, she denies any changes in cognitive function, slurring of speech, difficulty swallowing, Lhermitte's phenomena, back pain, change in bowel or bladder functions, increased fatigue, or incoordination.    She underwent a rather thorough autoimmune evaluation mostly because of the DVT that had developed spontaneously.  I reviewed the electronic health records, these were essentially unremarkable.  She has not had any imaging studies of the brain or spinal axis.    Medically, the DVT was presumed related to estrogen therapy.  She has a history of depression in the past, but no history of autoimmune disease, MS, seizure, hypertension, blood  "dyscrasia, diabetes, CVA, CAD, PVD, pulmonary disease, liver or kidney disease, or neurodegenerative disease.    There is no surgical history of note.    Menstrual history is remarkable for her being perimenopausal, her last menses in August, 2023.    Family history is unremarkable for anyone with similar symptoms or holding a diagnosis of demyelinating disease.  She has a sister with seizures.    She rarely drinks alcohol, does not smoke.  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  She takes baby aspirin.    Review of Systems   Constitutional:  Negative for malaise/fatigue.   Gastrointestinal:  Negative for constipation.   Genitourinary:  Negative for frequency.   Musculoskeletal:  Negative for falls.   Neurological:  Positive for tingling. Negative for dizziness, tremors, speech change, focal weakness, loss of consciousness and headaches.   Psychiatric/Behavioral:  Negative for memory loss.    All other systems reviewed and are negative.    Objective     /64 (BP Location: Right arm, Patient Position: Sitting, BP Cuff Size: Adult)   Pulse 83   Temp 36.8 °C (98.3 °F) (Temporal)   Ht 1.651 m (5' 5\")   Wt 52.6 kg (115 lb 15.4 oz)   SpO2 95%   BMI 19.30 kg/m²      Physical Exam    She appears in no acute distress.  Very pleasant in spirit and demeanor, she is quite cooperative.  Vital signs are stable.  There is no malar rash, jaw or temporal tenderness, jaw claudication, or allodynia.  There is no conjunctival injection, rhinorrhea or lacrimation.  The neck is supple, range of motion is full, compression maneuvers are negative.  Lhermitte's phenomena is absent. Carotid pulses are present without asymmetry.  Cardiac evaluation reveals a regular rhythm.  There is no tenderness in the upper extremities at the elbows and wrists, compression maneuvers are negative.  Distal pulses are intact.  There is no swelling or tenderness involving the left ankle, distal " pulses are intact as well.  Straight leg raising is negative bilaterally.  There is no lower extremity edema.     Neurological Exam    Fully oriented, there is no aphasia, agnosia, apraxia, or inattention.    PERRLA/EOMI, funduscopic exam reveals a sharp disc margin on the right.  I could not visualize the fundus on the left.  Visual fields are full to finger counting on confrontation bilaterally.  There is a subjective decrement of pin and temperature on the left side of the face in its entirety, when compared to the right side.  There is no facial asymmetry.  The tongue and uvula are midline, there is no bulbar dysfunction.  Jaw jerk is absent.  Shoulder shrug and head rotation are normal.    Musculoskeletal exam reveals normal tone throughout, there is no tremor, asterixis, or drift.  Strength is intact in all 4 extremities.    Reflexes do show an asymmetry with those on the left brisker than on the right, marked in the lower extremities.  There are no drop reflexes in the upper extremities.  This includes spread and cross adductor on the left. The right toe is downgoing, the left is equivocal.  There is 1 beat ankle clonus bilaterally.    As she walks, gait is symmetric and stride length, armswing as well.  Repetitive movements are slowed with the left foot, though she does complain of discomfort.  Heel, toe, and tandem walking are all normal.  There is no appendicular dystaxia.    Sensory exam again reveals a subjective decrement of pin and temperature in the left upper extremity when compared to the right, it is more symmetric in the lower extremities.  Vibration is felt equally throughout.  There is no evidence of a spinal cord sensory level posteriorly up the spinal axis to pinprick.  Romberg is absent.    Assessment & Plan     1. Paresthesias  Though there are some mild subjective as well as objective decrements of sensation in the left face and upper extremity, it is the asymmetry of reflexes and an  equivocal tow on the left that are a bit more disconcerting.  MRI imaging of the brain needs to be done initially, we may need to consider imaging of the cervical spine and even CSF study depending on results of the former.      In this patient, obviously CNS demyelinating disease or other CNS autoimmune illnesses need to be considered.  This may turn out to be nothing of consideration, but before we go there, we need to be thorough.  Left lower extremity difficulties I think have to do with tarsal tunnel syndrome which was diagnosed and has been treated efficaciously.    Face-to-face time was spent talking with the patient and her  about all of the above.  The rationale for MRI imaging of the brain as the first step before considering additional diagnostics also was discussed.  We also talked about symptoms that might suggest progression of an illness that needs to be treated more aggressively.  We will communicate via Onzo, follow-up appointment scheduled in a matter of months.    - MR-BRAIN-WITH & W/O; Future    Time: 70 minutes in total spent in patient care including pre-charting, record review, discussion with healthcare staff and documentation.  This includes face-to-face time for exam, review, discussion, as well as counseling and coordinating care.

## 2024-03-16 ENCOUNTER — HOSPITAL ENCOUNTER (OUTPATIENT)
Dept: RADIOLOGY | Facility: MEDICAL CENTER | Age: 49
End: 2024-03-16
Attending: PSYCHIATRY & NEUROLOGY
Payer: COMMERCIAL

## 2024-03-16 DIAGNOSIS — R20.2 PARESTHESIAS: ICD-10-CM

## 2024-03-16 PROCEDURE — 70553 MRI BRAIN STEM W/O & W/DYE: CPT

## 2024-03-16 PROCEDURE — 700117 HCHG RX CONTRAST REV CODE 255: Performed by: PSYCHIATRY & NEUROLOGY

## 2024-03-16 PROCEDURE — A9579 GAD-BASE MR CONTRAST NOS,1ML: HCPCS | Performed by: PSYCHIATRY & NEUROLOGY

## 2024-03-16 RX ADMIN — GADOTERIDOL 10 ML: 279.3 INJECTION, SOLUTION INTRAVENOUS at 11:44

## 2024-05-10 ENCOUNTER — APPOINTMENT (OUTPATIENT)
Dept: URGENT CARE | Facility: CLINIC | Age: 49
End: 2024-05-10
Payer: COMMERCIAL

## 2024-05-10 ENCOUNTER — HOSPITAL ENCOUNTER (OUTPATIENT)
Dept: RADIOLOGY | Facility: MEDICAL CENTER | Age: 49
End: 2024-05-10
Attending: STUDENT IN AN ORGANIZED HEALTH CARE EDUCATION/TRAINING PROGRAM
Payer: COMMERCIAL

## 2024-05-10 DIAGNOSIS — M79.641 RIGHT HAND PAIN: ICD-10-CM

## 2024-05-19 ENCOUNTER — HOSPITAL ENCOUNTER (OUTPATIENT)
Facility: MEDICAL CENTER | Age: 49
End: 2024-05-19
Payer: COMMERCIAL

## 2024-05-19 ENCOUNTER — HOSPITAL ENCOUNTER (INPATIENT)
Facility: MEDICAL CENTER | Age: 49
LOS: 3 days | DRG: 690 | End: 2024-05-22
Attending: EMERGENCY MEDICINE | Admitting: HOSPITALIST
Payer: COMMERCIAL

## 2024-05-19 ENCOUNTER — OFFICE VISIT (OUTPATIENT)
Dept: URGENT CARE | Facility: CLINIC | Age: 49
End: 2024-05-19
Payer: COMMERCIAL

## 2024-05-19 VITALS
HEART RATE: 101 BPM | RESPIRATION RATE: 17 BRPM | BODY MASS INDEX: 18.83 KG/M2 | SYSTOLIC BLOOD PRESSURE: 117 MMHG | HEIGHT: 65 IN | WEIGHT: 113 LBS | OXYGEN SATURATION: 97 % | DIASTOLIC BLOOD PRESSURE: 72 MMHG | TEMPERATURE: 98.8 F

## 2024-05-19 DIAGNOSIS — D72.829 LEUKOCYTOSIS, UNSPECIFIED TYPE: ICD-10-CM

## 2024-05-19 DIAGNOSIS — N12 PYELONEPHRITIS: ICD-10-CM

## 2024-05-19 DIAGNOSIS — R31.9 HEMATURIA, UNSPECIFIED TYPE: ICD-10-CM

## 2024-05-19 DIAGNOSIS — R11.2 NAUSEA AND VOMITING, UNSPECIFIED VOMITING TYPE: Chronic | ICD-10-CM

## 2024-05-19 DIAGNOSIS — N30.01 ACUTE CYSTITIS WITH HEMATURIA: ICD-10-CM

## 2024-05-19 PROBLEM — N10 ACUTE PYELONEPHRITIS: Status: ACTIVE | Noted: 2024-05-19

## 2024-05-19 PROBLEM — Z78.9 FULL CODE STATUS: Status: ACTIVE | Noted: 2024-05-19

## 2024-05-19 PROBLEM — R19.7 DIARRHEA: Status: ACTIVE | Noted: 2024-05-19

## 2024-05-19 PROBLEM — R65.10 SIRS (SYSTEMIC INFLAMMATORY RESPONSE SYNDROME) (HCC): Status: ACTIVE | Noted: 2024-05-19

## 2024-05-19 LAB
ALBUMIN SERPL BCP-MCNC: 4.4 G/DL (ref 3.2–4.9)
ALBUMIN SERPL BCP-MCNC: 4.6 G/DL (ref 3.2–4.9)
ALBUMIN/GLOB SERPL: 1.5 G/DL
ALBUMIN/GLOB SERPL: 1.6 G/DL
ALP SERPL-CCNC: 88 U/L (ref 30–99)
ALP SERPL-CCNC: 89 U/L (ref 30–99)
ALT SERPL-CCNC: 14 U/L (ref 2–50)
ALT SERPL-CCNC: 14 U/L (ref 2–50)
ANION GAP SERPL CALC-SCNC: 11 MMOL/L (ref 7–16)
ANION GAP SERPL CALC-SCNC: 11 MMOL/L (ref 7–16)
APPEARANCE UR: CLEAR
APPEARANCE UR: NORMAL
AST SERPL-CCNC: 18 U/L (ref 12–45)
AST SERPL-CCNC: 19 U/L (ref 12–45)
BACTERIA #/AREA URNS HPF: ABNORMAL /HPF
BASOPHILS # BLD AUTO: 0.3 % (ref 0–1.8)
BASOPHILS # BLD AUTO: 0.4 % (ref 0–1.8)
BASOPHILS # BLD: 0.05 K/UL (ref 0–0.12)
BASOPHILS # BLD: 0.06 K/UL (ref 0–0.12)
BILIRUB SERPL-MCNC: 0.4 MG/DL (ref 0.1–1.5)
BILIRUB SERPL-MCNC: 0.5 MG/DL (ref 0.1–1.5)
BILIRUB UR QL STRIP.AUTO: NEGATIVE
BILIRUB UR STRIP-MCNC: NEGATIVE MG/DL
BUN SERPL-MCNC: 10 MG/DL (ref 8–22)
BUN SERPL-MCNC: 12 MG/DL (ref 8–22)
CALCIUM ALBUM COR SERPL-MCNC: 8.8 MG/DL (ref 8.5–10.5)
CALCIUM ALBUM COR SERPL-MCNC: 9 MG/DL (ref 8.5–10.5)
CALCIUM SERPL-MCNC: 9.1 MG/DL (ref 8.4–10.2)
CALCIUM SERPL-MCNC: 9.5 MG/DL (ref 8.5–10.5)
CHLORIDE SERPL-SCNC: 103 MMOL/L (ref 96–112)
CHLORIDE SERPL-SCNC: 104 MMOL/L (ref 96–112)
CO2 SERPL-SCNC: 22 MMOL/L (ref 20–33)
CO2 SERPL-SCNC: 24 MMOL/L (ref 20–33)
COLOR UR AUTO: NORMAL
COLOR UR: YELLOW
CREAT SERPL-MCNC: 0.62 MG/DL (ref 0.5–1.4)
CREAT SERPL-MCNC: 0.66 MG/DL (ref 0.5–1.4)
EOSINOPHIL # BLD AUTO: 0.04 K/UL (ref 0–0.51)
EOSINOPHIL # BLD AUTO: 0.29 K/UL (ref 0–0.51)
EOSINOPHIL NFR BLD: 0.2 % (ref 0–6.9)
EOSINOPHIL NFR BLD: 1.7 % (ref 0–6.9)
EPI CELLS #/AREA URNS HPF: ABNORMAL /HPF
ERYTHROCYTE [DISTWIDTH] IN BLOOD BY AUTOMATED COUNT: 39.1 FL (ref 35.9–50)
ERYTHROCYTE [DISTWIDTH] IN BLOOD BY AUTOMATED COUNT: 40.9 FL (ref 35.9–50)
GFR SERPLBLD CREATININE-BSD FMLA CKD-EPI: 109 ML/MIN/1.73 M 2
GFR SERPLBLD CREATININE-BSD FMLA CKD-EPI: 111 ML/MIN/1.73 M 2
GLOBULIN SER CALC-MCNC: 2.8 G/DL (ref 1.9–3.5)
GLOBULIN SER CALC-MCNC: 3.1 G/DL (ref 1.9–3.5)
GLUCOSE SERPL-MCNC: 115 MG/DL (ref 65–99)
GLUCOSE SERPL-MCNC: 118 MG/DL (ref 65–99)
GLUCOSE UR STRIP.AUTO-MCNC: NEGATIVE MG/DL
GLUCOSE UR STRIP.AUTO-MCNC: NEGATIVE MG/DL
HCT VFR BLD AUTO: 44.7 % (ref 37–47)
HCT VFR BLD AUTO: 47.9 % (ref 37–47)
HEMOCCULT STL QL: NEGATIVE
HGB BLD-MCNC: 14.9 G/DL (ref 12–16)
HGB BLD-MCNC: 16.4 G/DL (ref 12–16)
IMM GRANULOCYTES # BLD AUTO: 0.09 K/UL (ref 0–0.11)
IMM GRANULOCYTES # BLD AUTO: 0.1 K/UL (ref 0–0.11)
IMM GRANULOCYTES NFR BLD AUTO: 0.5 % (ref 0–0.9)
IMM GRANULOCYTES NFR BLD AUTO: 0.6 % (ref 0–0.9)
KETONES UR STRIP.AUTO-MCNC: NEGATIVE MG/DL
KETONES UR STRIP.AUTO-MCNC: NEGATIVE MG/DL
LACTATE SERPL-SCNC: 1.3 MMOL/L (ref 0.5–2)
LACTATE SERPL-SCNC: 1.5 MMOL/L (ref 0.5–2)
LACTATE SERPL-SCNC: 2.4 MMOL/L (ref 0.5–2)
LEUKOCYTE ESTERASE UR QL STRIP.AUTO: ABNORMAL
LEUKOCYTE ESTERASE UR QL STRIP.AUTO: NORMAL
LYMPHOCYTES # BLD AUTO: 0.48 K/UL (ref 1–4.8)
LYMPHOCYTES # BLD AUTO: 1.13 K/UL (ref 1–4.8)
LYMPHOCYTES NFR BLD: 2.9 % (ref 22–41)
LYMPHOCYTES NFR BLD: 6.7 % (ref 22–41)
MCH RBC QN AUTO: 30.8 PG (ref 27–33)
MCH RBC QN AUTO: 31.6 PG (ref 27–33)
MCHC RBC AUTO-ENTMCNC: 33.3 G/DL (ref 32.2–35.5)
MCHC RBC AUTO-ENTMCNC: 34.2 G/DL (ref 32.2–35.5)
MCV RBC AUTO: 92.3 FL (ref 81.4–97.8)
MCV RBC AUTO: 92.4 FL (ref 81.4–97.8)
MICRO URNS: ABNORMAL
MONOCYTES # BLD AUTO: 0.64 K/UL (ref 0–0.85)
MONOCYTES # BLD AUTO: 0.98 K/UL (ref 0–0.85)
MONOCYTES NFR BLD AUTO: 3.9 % (ref 0–13.4)
MONOCYTES NFR BLD AUTO: 5.8 % (ref 0–13.4)
NEUTROPHILS # BLD AUTO: 14.37 K/UL (ref 1.82–7.42)
NEUTROPHILS # BLD AUTO: 15.32 K/UL (ref 1.82–7.42)
NEUTROPHILS NFR BLD: 84.8 % (ref 44–72)
NEUTROPHILS NFR BLD: 92.2 % (ref 44–72)
NITRITE UR QL STRIP.AUTO: NEGATIVE
NITRITE UR QL STRIP.AUTO: NEGATIVE
NRBC # BLD AUTO: 0 K/UL
NRBC # BLD AUTO: 0 K/UL
NRBC BLD-RTO: 0 /100 WBC (ref 0–0.2)
NRBC BLD-RTO: 0 /100 WBC (ref 0–0.2)
PH UR STRIP.AUTO: 5.5 [PH] (ref 5–8)
PH UR STRIP.AUTO: 5.5 [PH] (ref 5–8)
PLATELET # BLD AUTO: 269 K/UL (ref 164–446)
PLATELET # BLD AUTO: 298 K/UL (ref 164–446)
PMV BLD AUTO: 9.5 FL (ref 9–12.9)
PMV BLD AUTO: 9.6 FL (ref 9–12.9)
POCT INT CON NEG: NEGATIVE
POCT INT CON POS: POSITIVE
POCT URINE PREGNANCY TEST: NEGATIVE
POTASSIUM SERPL-SCNC: 3.6 MMOL/L (ref 3.6–5.5)
POTASSIUM SERPL-SCNC: 3.8 MMOL/L (ref 3.6–5.5)
PROCALCITONIN SERPL-MCNC: 0.02 NG/ML
PROT SERPL-MCNC: 7.2 G/DL (ref 6–8.2)
PROT SERPL-MCNC: 7.7 G/DL (ref 6–8.2)
PROT UR QL STRIP: 100 MG/DL
PROT UR QL STRIP: NEGATIVE MG/DL
RBC # BLD AUTO: 4.84 M/UL (ref 4.2–5.4)
RBC # BLD AUTO: 5.19 M/UL (ref 4.2–5.4)
RBC # URNS HPF: ABNORMAL /HPF
RBC UR QL AUTO: ABNORMAL
RBC UR QL AUTO: NORMAL
SODIUM SERPL-SCNC: 136 MMOL/L (ref 135–145)
SODIUM SERPL-SCNC: 139 MMOL/L (ref 135–145)
SP GR UR STRIP.AUTO: <=1.005
SP GR UR STRIP.AUTO: <=1.005
UROBILINOGEN UR STRIP-MCNC: 0.2 MG/DL
WBC # BLD AUTO: 16.6 K/UL (ref 4.8–10.8)
WBC # BLD AUTO: 16.9 K/UL (ref 4.8–10.8)
WBC #/AREA URNS HPF: ABNORMAL /HPF

## 2024-05-19 PROCEDURE — 99223 1ST HOSP IP/OBS HIGH 75: CPT | Performed by: HOSPITALIST

## 2024-05-19 PROCEDURE — 3074F SYST BP LT 130 MM HG: CPT

## 2024-05-19 PROCEDURE — 81025 URINE PREGNANCY TEST: CPT

## 2024-05-19 PROCEDURE — 81002 URINALYSIS NONAUTO W/O SCOPE: CPT

## 2024-05-19 PROCEDURE — 99215 OFFICE O/P EST HI 40 MIN: CPT

## 2024-05-19 PROCEDURE — 3078F DIAST BP <80 MM HG: CPT

## 2024-05-19 RX ORDER — ENOXAPARIN SODIUM 100 MG/ML
40 INJECTION SUBCUTANEOUS DAILY
Status: DISCONTINUED | OUTPATIENT
Start: 2024-05-20 | End: 2024-05-22 | Stop reason: HOSPADM

## 2024-05-19 RX ORDER — AZELASTINE 1 MG/ML
1 SPRAY, METERED NASAL
COMMUNITY

## 2024-05-19 RX ORDER — MORPHINE SULFATE 4 MG/ML
2 INJECTION INTRAVENOUS
Status: DISCONTINUED | OUTPATIENT
Start: 2024-05-19 | End: 2024-05-22 | Stop reason: HOSPADM

## 2024-05-19 RX ORDER — LORATADINE 10 MG/1
10 TABLET ORAL
COMMUNITY

## 2024-05-19 RX ORDER — PHENAZOPYRIDINE HYDROCHLORIDE 200 MG/1
200 TABLET, FILM COATED ORAL 3 TIMES DAILY
Qty: 6 TABLET | Refills: 0 | Status: ON HOLD | OUTPATIENT
Start: 2024-05-19 | End: 2024-05-22

## 2024-05-19 RX ORDER — ONDANSETRON 4 MG/1
4 TABLET, ORALLY DISINTEGRATING ORAL EVERY 4 HOURS PRN
Status: DISCONTINUED | OUTPATIENT
Start: 2024-05-19 | End: 2024-05-22 | Stop reason: HOSPADM

## 2024-05-19 RX ORDER — PROCHLORPERAZINE EDISYLATE 5 MG/ML
5-10 INJECTION INTRAMUSCULAR; INTRAVENOUS EVERY 4 HOURS PRN
Status: DISCONTINUED | OUTPATIENT
Start: 2024-05-19 | End: 2024-05-22 | Stop reason: HOSPADM

## 2024-05-19 RX ORDER — PHENAZOPYRIDINE HYDROCHLORIDE 200 MG/1
200 TABLET, FILM COATED ORAL ONCE
Status: COMPLETED | OUTPATIENT
Start: 2024-05-19 | End: 2024-05-19

## 2024-05-19 RX ORDER — LABETALOL HYDROCHLORIDE 5 MG/ML
10 INJECTION, SOLUTION INTRAVENOUS EVERY 4 HOURS PRN
Status: DISCONTINUED | OUTPATIENT
Start: 2024-05-19 | End: 2024-05-22 | Stop reason: HOSPADM

## 2024-05-19 RX ORDER — PROMETHAZINE HYDROCHLORIDE 25 MG/1
12.5-25 TABLET ORAL EVERY 4 HOURS PRN
Status: DISCONTINUED | OUTPATIENT
Start: 2024-05-19 | End: 2024-05-22 | Stop reason: HOSPADM

## 2024-05-19 RX ORDER — DOXYCYCLINE HYCLATE 100 MG
100 TABLET ORAL 2 TIMES DAILY
Status: ON HOLD | COMMUNITY
End: 2024-05-22

## 2024-05-19 RX ORDER — IBUPROFEN 200 MG
100 TABLET ORAL EVERY 6 HOURS PRN
Status: ON HOLD | COMMUNITY
End: 2024-05-22

## 2024-05-19 RX ORDER — SODIUM CHLORIDE, SODIUM LACTATE, POTASSIUM CHLORIDE, CALCIUM CHLORIDE 600; 310; 30; 20 MG/100ML; MG/100ML; MG/100ML; MG/100ML
1000 INJECTION, SOLUTION INTRAVENOUS ONCE
Status: COMPLETED | OUTPATIENT
Start: 2024-05-19 | End: 2024-05-19

## 2024-05-19 RX ORDER — PROMETHAZINE HYDROCHLORIDE 25 MG/1
12.5-25 SUPPOSITORY RECTAL EVERY 4 HOURS PRN
Status: DISCONTINUED | OUTPATIENT
Start: 2024-05-19 | End: 2024-05-22 | Stop reason: HOSPADM

## 2024-05-19 RX ORDER — CEFTRIAXONE 1 G/1
1000 INJECTION, POWDER, FOR SOLUTION INTRAMUSCULAR; INTRAVENOUS ONCE
Status: COMPLETED | OUTPATIENT
Start: 2024-05-19 | End: 2024-05-19

## 2024-05-19 RX ORDER — OXYCODONE HYDROCHLORIDE 5 MG/1
2.5 TABLET ORAL
Status: DISCONTINUED | OUTPATIENT
Start: 2024-05-19 | End: 2024-05-22 | Stop reason: HOSPADM

## 2024-05-19 RX ORDER — ONDANSETRON 2 MG/ML
4 INJECTION INTRAMUSCULAR; INTRAVENOUS EVERY 4 HOURS PRN
Status: DISCONTINUED | OUTPATIENT
Start: 2024-05-19 | End: 2024-05-22 | Stop reason: HOSPADM

## 2024-05-19 RX ORDER — CLINDAMYCIN HYDROCHLORIDE 150 MG/1
150 CAPSULE ORAL 4 TIMES DAILY
Status: ON HOLD | COMMUNITY
End: 2024-05-22

## 2024-05-19 RX ORDER — OXYCODONE HYDROCHLORIDE 5 MG/1
5 TABLET ORAL
Status: DISCONTINUED | OUTPATIENT
Start: 2024-05-19 | End: 2024-05-22 | Stop reason: HOSPADM

## 2024-05-19 RX ORDER — ACETAMINOPHEN 325 MG/1
650 TABLET ORAL EVERY 6 HOURS PRN
Status: DISCONTINUED | OUTPATIENT
Start: 2024-05-19 | End: 2024-05-22 | Stop reason: HOSPADM

## 2024-05-19 RX ORDER — SODIUM CHLORIDE 9 MG/ML
INJECTION, SOLUTION INTRAVENOUS CONTINUOUS
Status: DISCONTINUED | OUTPATIENT
Start: 2024-05-19 | End: 2024-05-22 | Stop reason: HOSPADM

## 2024-05-19 RX ADMIN — SODIUM CHLORIDE, POTASSIUM CHLORIDE, SODIUM LACTATE AND CALCIUM CHLORIDE 1000 ML: 600; 310; 30; 20 INJECTION, SOLUTION INTRAVENOUS at 15:59

## 2024-05-19 RX ADMIN — CEFTRIAXONE SODIUM 1000 MG: 1 INJECTION, POWDER, FOR SOLUTION INTRAMUSCULAR; INTRAVENOUS at 15:58

## 2024-05-19 RX ADMIN — PHENAZOPYRIDINE 200 MG: 200 TABLET ORAL at 15:58

## 2024-05-19 RX ADMIN — ONDANSETRON 4 MG: 2 INJECTION INTRAMUSCULAR; INTRAVENOUS at 19:53

## 2024-05-19 RX ADMIN — SODIUM CHLORIDE: 9 INJECTION, SOLUTION INTRAVENOUS at 19:53

## 2024-05-19 SDOH — ECONOMIC STABILITY: TRANSPORTATION INSECURITY
IN THE PAST 12 MONTHS, HAS THE LACK OF TRANSPORTATION KEPT YOU FROM MEDICAL APPOINTMENTS OR FROM GETTING MEDICATIONS?: NO

## 2024-05-19 SDOH — ECONOMIC STABILITY: TRANSPORTATION INSECURITY
IN THE PAST 12 MONTHS, HAS LACK OF RELIABLE TRANSPORTATION KEPT YOU FROM MEDICAL APPOINTMENTS, MEETINGS, WORK OR FROM GETTING THINGS NEEDED FOR DAILY LIVING?: NO

## 2024-05-19 ASSESSMENT — ENCOUNTER SYMPTOMS
BLOOD IN STOOL: 0
INSOMNIA: 0
NAUSEA: 1
FLANK PAIN: 1
STRIDOR: 0
ABDOMINAL PAIN: 1
VOMITING: 0
NAUSEA: 0
EYE DISCHARGE: 0
DIARRHEA: 1
SENSORY CHANGE: 0
ORTHOPNEA: 0
BLOOD IN STOOL: 0
FEVER: 1
SINUS PAIN: 0
BACK PAIN: 0
WHEEZING: 0
EYES NEGATIVE: 1
CARDIOVASCULAR NEGATIVE: 1
FALLS: 0
DEPRESSION: 0
SHORTNESS OF BREATH: 1
MYALGIAS: 1
PND: 0
COUGH: 0
CHILLS: 0
FLANK PAIN: 0
SEIZURES: 0
POLYDIPSIA: 0
BRUISES/BLEEDS EASILY: 0
DIAPHORESIS: 1
TINGLING: 0
EYE REDNESS: 0
FOCAL WEAKNESS: 0
SPUTUM PRODUCTION: 0
HEARTBURN: 0
ABDOMINAL PAIN: 0
DIARRHEA: 1
CHILLS: 1
DIZZINESS: 1
PHOTOPHOBIA: 0
PSYCHIATRIC NEGATIVE: 1
FEVER: 0
DOUBLE VISION: 0

## 2024-05-19 ASSESSMENT — LIFESTYLE VARIABLES
ALCOHOL_USE: YES
TOTAL SCORE: 0
AVERAGE NUMBER OF DAYS PER WEEK YOU HAVE A DRINK CONTAINING ALCOHOL: 1
HAVE PEOPLE ANNOYED YOU BY CRITICIZING YOUR DRINKING: NO
TOTAL SCORE: 0
TOTAL SCORE: 0
EVER FELT BAD OR GUILTY ABOUT YOUR DRINKING: NO
ON A TYPICAL DAY WHEN YOU DRINK ALCOHOL HOW MANY DRINKS DO YOU HAVE: 1
SUBSTANCE_ABUSE: 0
CONSUMPTION TOTAL: NEGATIVE
DOES PATIENT WANT TO STOP DRINKING: CANNOT ASSESS
EVER HAD A DRINK FIRST THING IN THE MORNING TO STEADY YOUR NERVES TO GET RID OF A HANGOVER: NO
HOW MANY TIMES IN THE PAST YEAR HAVE YOU HAD 5 OR MORE DRINKS IN A DAY: 0
HAVE YOU EVER FELT YOU SHOULD CUT DOWN ON YOUR DRINKING: NO

## 2024-05-19 ASSESSMENT — SOCIAL DETERMINANTS OF HEALTH (SDOH)
WITHIN THE LAST YEAR, HAVE YOU BEEN HUMILIATED OR EMOTIONALLY ABUSED IN OTHER WAYS BY YOUR PARTNER OR EX-PARTNER?: NO
IN THE PAST 12 MONTHS, HAS THE ELECTRIC, GAS, OIL, OR WATER COMPANY THREATENED TO SHUT OFF SERVICE IN YOUR HOME?: NO
WITHIN THE PAST 12 MONTHS, THE FOOD YOU BOUGHT JUST DIDN'T LAST AND YOU DIDN'T HAVE MONEY TO GET MORE: NEVER TRUE
WITHIN THE LAST YEAR, HAVE TO BEEN RAPED OR FORCED TO HAVE ANY KIND OF SEXUAL ACTIVITY BY YOUR PARTNER OR EX-PARTNER?: NO
WITHIN THE LAST YEAR, HAVE YOU BEEN KICKED, HIT, SLAPPED, OR OTHERWISE PHYSICALLY HURT BY YOUR PARTNER OR EX-PARTNER?: NO
WITHIN THE LAST YEAR, HAVE YOU BEEN AFRAID OF YOUR PARTNER OR EX-PARTNER?: NO
WITHIN THE PAST 12 MONTHS, YOU WORRIED THAT YOUR FOOD WOULD RUN OUT BEFORE YOU GOT THE MONEY TO BUY MORE: NEVER TRUE

## 2024-05-19 ASSESSMENT — PATIENT HEALTH QUESTIONNAIRE - PHQ9
2. FEELING DOWN, DEPRESSED, IRRITABLE, OR HOPELESS: NOT AT ALL
1. LITTLE INTEREST OR PLEASURE IN DOING THINGS: NOT AT ALL
SUM OF ALL RESPONSES TO PHQ9 QUESTIONS 1 AND 2: 0

## 2024-05-19 ASSESSMENT — COGNITIVE AND FUNCTIONAL STATUS - GENERAL
SUGGESTED CMS G CODE MODIFIER MOBILITY: CH
MOBILITY SCORE: 24
DAILY ACTIVITIY SCORE: 24
SUGGESTED CMS G CODE MODIFIER DAILY ACTIVITY: CH

## 2024-05-19 ASSESSMENT — FIBROSIS 4 INDEX
FIB4 SCORE: 0.82
FIB4 SCORE: 0.78
FIB4 SCORE: 0.78

## 2024-05-19 ASSESSMENT — PAIN DESCRIPTION - PAIN TYPE
TYPE: ACUTE PAIN
TYPE: ACUTE PAIN

## 2024-05-19 ASSESSMENT — VISUAL ACUITY: OU: 1

## 2024-05-19 NOTE — ED NOTES
Medication history reviewed with pt. Med rec is complete.  Allergies reviewed, per pt  Interviewed pt with  at bedside with permission from pt.    Patient has had outpatient antibiotics in the last 30 days, pt started DOXYCYLINE 100MG on 5/9/2024 for 7 day course, pt only took for 2 dose.   Pt started CLINDAMYCIN 150MG on 5/10/2024 for 5 day course.  Per pt reports that she did finish course of antibiotic.       Pt is not on any anticoagulants

## 2024-05-19 NOTE — ED PROVIDER NOTES
ED Provider Note    CHIEF COMPLAINT  Chief Complaint   Patient presents with    Blood in Urine     Started this am       Painful Urination     Started this am      Sent from Urgent Care     States had blood work done at  this am  Sent to ED for abnormal WBC       EXTERNAL RECORDS REVIEWED  Outpatient Notes reviewed outpatient note from ARLYN Hollis from urgent care.    HPI/ROS  LIMITATION TO HISTORY   Select: : None  OUTSIDE HISTORIAN(S):  Family  at bedside given history about timelines of their vacation and food poisoning    Karis Gerardo is a 48 y.o. female who presents with a report that she had gone to urgent care this morning had some blood work and sent to the emergency department for abnormal elevated white blood cell count.  She has a history of recently going a couple weeks ago to [place] and spent 5 days there and was sick most of the time with what she thought was food poisoning with vomiting and diarrhea.  This finally resolved and she came home and unfortunately sustained a dog bite to her right hand and took at first doxycycline and then subsequently clindamycin and started to have diarrhea once again.  Subsequent to that starting today she started to have some clots in her urine with painful urination and thinks she may have UTI.  Also has some left flank discomfort.  Has had multiple episodes of vomiting today but denies any ongoing diarrhea    PAST MEDICAL HISTORY   has a past medical history of Patient denies medical problems.    SURGICAL HISTORY   has a past surgical history that includes nasal reconstruction.    FAMILY HISTORY  Family History   Problem Relation Age of Onset    Hypertension Father     Hyperlipidemia Sister     Cancer Paternal Aunt         breast       SOCIAL HISTORY  Social History     Tobacco Use    Smoking status: Never    Smokeless tobacco: Never   Vaping Use    Vaping status: Never Used   Substance and Sexual Activity    Alcohol use: Yes    Drug use: Not  "Currently     Comment: denies    Sexual activity: Not on file       CURRENT MEDICATIONS  Home Medications    **Home medications have not yet been reviewed for this encounter**         ALLERGIES  Allergies   Allergen Reactions    Pcn [Penicillins] Hives     RXN=as a child    Sulfa Drugs Hives     RXN=as a child       PHYSICAL EXAM  VITAL SIGNS: /77   Pulse (!) 101   Temp 36.6 °C (97.9 °F)   Resp 14   Ht 1.651 m (5' 5\")   Wt 51.3 kg (113 lb 1.5 oz)   LMP 05/15/2024   SpO2 95%   BMI 18.82 kg/m²    Constitutional: Well developed, Well nourished, No acute distress, Non-toxic appearance.   HENT: Normocephalic, Atraumatic, Bilateral external ears normal, Oropharynx is clear mucous membranes are dry. No oral exudates or nasal discharge.   Eyes: Pupils are equal round and reactive, EOMI, Conjunctiva normal, No discharge.   Neck: Normal range of motion, No tenderness, Supple, No stridor. No meningismus.  Lymphatic: No lymphadenopathy noted.   Cardiovascular: Tachycardic rate and rhythm without murmur rub or gallop.  Thorax & Lungs: Clear breath sounds bilaterally without wheezes, rhonchi or rales. There is no chest wall tenderness.   Abdomen: Soft non-tender non-distended. There is no rebound or guarding. No organomegaly is appreciated. Bowel sounds are normal.  Skin: Normal without rash.   Back: Left-sided CVA tenderness.   Extremities: Intact distal pulses, No edema, No tenderness, No cyanosis, No clubbing. Capillary refill is less than 2 seconds.  Musculoskeletal: Good range of motion in all major joints. No tenderness to palpation or major deformities noted.   Neurologic: Alert & oriented x 3, Normal motor function, Normal sensory function, No focal deficits noted. Reflexes are normal.  Psychiatric: Affect normal, Judgment normal, Mood normal. There is no suicidal ideation or patient reported hallucinations.         COURSE & MEDICAL DECISION MAKING    ASSESSMENT, COURSE AND PLAN  Care Narrative:   Sepsis: " Infection was suspected 1547 hrs. (Time). Sepsis pathway was initiated. Fluids not needed (no hypotension or lactate greater than or equal to 4). Antibiotics were given per protocol.  Actually 1 L of lactated Ringer's was given for mild tachycardia but lactate was only 2.6    Laboratory evaluation reveals white blood cell count elevated at 16,600 with 92% PMN shift.  Urinalysis reveals 10-20 white cells per high-power field with moderate bacteria and large occult blood with small leukocyte Estrace.  Lactate again 2.6    She did not require any imaging.  I have a strong suspicion this is pyelonephritis given vomiting all day and likely had some recent antibiotic associated diarrhea that seems to be resolving.    Spoke with hospitalist after speak with the patient who is comfortable with admission and she is admitted in stable condition after receiving initial antibiotics    DISPOSITION AND DISCUSSIONS  I have discussed management of the patient with the following physicians and NORBERTO's: Spoke with Dr. Weldon at approximately 4:25 PM regarding the patient's need for admission and he accepts    FINAL DIAGNOSIS  1. Pyelonephritis           Electronically signed by: Sridhar Henry M.D., 5/19/2024 3:47 PM

## 2024-05-19 NOTE — PROGRESS NOTES
Subjective:   Karis Gerardo is a very pleasant 48 y.o. female who presents for:    Chief Complaint   Patient presents with    UTI     blood in urine 1 day       HPI:    The patient presents to the urgent care for evaluation of hematuria.  She endorses that this morning she woke up and her urine was malodorous.  Over the course of the morning, she developed dysuria, urinary frequency, urinary hesitancy, urinary urgency, and hematuria.  She has been passing small clots over the past 2 hours.  She spoke with her primary care provider about her symptoms, who prescribed nitrofurantoin.  She took 1 dose this morning, but is concerned about the ongoing hematuria.  5 days ago, she completed a prescription for clindamycin after being bit by a dog on her right hand.  She feels that her wound has improved.  Denies fever, chills, myalgias, flank pain.  No history of nephrolithiasis, clotting disorder, anticoagulation use, kidney disease, pyelonephritis.  She reports ongoing fatigue and mild diarrhea after completing the dose of antibiotics.    ROS:    Review of Systems   Constitutional:  Positive for malaise/fatigue. Negative for chills and fever.   Gastrointestinal:  Positive for diarrhea. Negative for abdominal pain, blood in stool, melena, nausea and vomiting.   Genitourinary:  Positive for dysuria, frequency, hematuria and urgency. Negative for flank pain.   All other systems reviewed and are negative.      Medications:      No current outpatient medications on file.       Allergies:     Allergies   Allergen Reactions    Pcn [Penicillins] Hives     RXN=as a child    Sulfa Drugs Hives     RXN=as a child       Problem List:     Patient Active Problem List   Diagnosis    Sprain of left wrist    Paresthesias    Acute pyelonephritis    Leukocytosis    SIRS (systemic inflammatory response syndrome) (HCC)    Diarrhea    Hypokalemia    Nausea and vomiting    Penicillin allergy       Surgical History:    Past Surgical History:    Procedure Laterality Date    NASAL RECONSTRUCTION         Past Social Hx:     Social History     Socioeconomic History    Marital status:     Highest education level: Master's degree (e.g., MA, MS, [place], MEd, MSW, [employer])   Tobacco Use    Smoking status: Never    Smokeless tobacco: Never   Vaping Use    Vaping status: Never Used   Substance and Sexual Activity    Alcohol use: Yes    Drug use: Not Currently     Comment: denies     Social Determinants of Health     Financial Resource Strain: Low Risk  (5/21/2024)    Overall Financial Resource Strain (CARDIA)     Difficulty of Paying Living Expenses: Not hard at all   Food Insecurity: No Food Insecurity (5/21/2024)    Hunger Vital Sign     Worried About Running Out of Food in the Last Year: Never true     Ran Out of Food in the Last Year: Never true   Transportation Needs: No Transportation Needs (5/21/2024)    PRAPARE - Transportation     Lack of Transportation (Medical): No     Lack of Transportation (Non-Medical): No   Physical Activity: Sufficiently Active (5/21/2024)    Exercise Vital Sign     Days of Exercise per Week: 7 days     Minutes of Exercise per Session: 30 min   Stress: Stress Concern Present (5/21/2024)    Citizen of [place] [employer] Occupational [employer] - Occupational Stress Questionnaire     Feeling of Stress : To some extent   Social Connections: Moderately Integrated (5/21/2024)    Social Connection and Isolation Panel [NHANES]     Frequency of Communication with Friends and Family: More than three times a week     Frequency of Social Gatherings with Friends and Family: Twice a week     Attends [employer] Services: Never     Active Member of Clubs or Organizations: Yes     Attends Club or Organization Meetings: More than 4 times per year     Marital Status:    Intimate Partner Violence: Not At Risk (5/19/2024)    Humiliation, Afraid, Rape, and Kick questionnaire     Fear of Current or Ex-Partner: No     Emotionally Abused: No     Physically  Abused: No     Sexually Abused: No   Housing Stability: Low Risk  (5/21/2024)    Housing Stability Vital Sign     Unable to Pay for Housing in the Last Year: No     Number of Places Lived in the Last Year: 1     Unstable Housing in the Last Year: No        Past Family Hx:      Family History   Problem Relation Age of Onset    Hypertension Father     Hyperlipidemia Sister     Cancer Paternal Aunt         breast       Problem list, medications, and allergies reviewed by myself today in Epic.     Objective:     Vitals:    05/19/24 1153   BP: 117/72   Pulse: (!) 101   Resp: 17   Temp: 37.1 °C (98.8 °F)   SpO2: 97%       Physical Exam  Vitals reviewed.   Constitutional:       General: She is not in acute distress.     Appearance: Normal appearance. She is normal weight. She is not ill-appearing, toxic-appearing or diaphoretic.   HENT:      Head: Normocephalic and atraumatic.      Nose: Nose normal.      Mouth/Throat:      Mouth: Mucous membranes are moist.   Eyes:      Extraocular Movements: Extraocular movements intact.      Conjunctiva/sclera: Conjunctivae normal.      Pupils: Pupils are equal, round, and reactive to light.   Cardiovascular:      Rate and Rhythm: Normal rate and regular rhythm.      Pulses: Normal pulses.      Heart sounds: Normal heart sounds.   Pulmonary:      Effort: Pulmonary effort is normal.   Abdominal:      General: Abdomen is flat.      Palpations: Abdomen is soft.      Tenderness: There is no abdominal tenderness. There is no right CVA tenderness, left CVA tenderness or guarding.   Musculoskeletal:         General: Normal range of motion.      Cervical back: Normal range of motion.   Skin:     General: Skin is warm and dry.   Neurological:      General: No focal deficit present.      Mental Status: She is alert and oriented to person, place, and time. Mental status is at baseline.   Psychiatric:         Mood and Affect: Mood normal.         Behavior: Behavior normal.         Thought Content:  Thought content normal.             Results from POCT:   Results for orders placed or performed in visit on 05/19/24   POCT Urinalysis   Result Value Ref Range    POC Color Pink Negative    POC Appearance Cloudy Negative    POC Glucose Negative Negative mg/dL    POC Bilirubin Negative Negative mg/dL    POC Ketones Negative Negative mg/dL    POC Specific Gravity <=1.005 <1.005 - >1.030    POC Blood Large Negative    POC Urine PH 5.5 5.0 - 8.0    POC Protein 100 Negative mg/dL    POC Urobiligen 0.2 Negative (0.2) mg/dL    POC Nitrites Negative Negative    POC Leukocyte Esterase Moderate Negative   POCT Pregnancy   Result Value Ref Range    POC Urine Pregnancy Test Negative     Internal Control Positive Positive     Internal Control Negative Negative       Latest Reference Range & Units 05/19/24 12:29   WBC 4.8 - 10.8 K/uL 16.9 (H)   RBC 4.20 - 5.40 M/uL 5.19   Hemoglobin 12.0 - 16.0 g/dL 16.4 (H)   Hematocrit 37.0 - 47.0 % 47.9 (H)   MCV 81.4 - 97.8 fL 92.3   MCH 27.0 - 33.0 pg 31.6   MCHC 32.2 - 35.5 g/dL 34.2   RDW 35.9 - 50.0 fL 40.9   Platelet Count 164 - 446 K/uL 298   MPV 9.0 - 12.9 fL 9.6   Neutrophils-Polys 44.00 - 72.00 % 84.80 (H)   Neutrophils (Absolute) 1.82 - 7.42 K/uL 14.37 (H)   Lymphocytes 22.00 - 41.00 % 6.70 (L)   Lymphs (Absolute) 1.00 - 4.80 K/uL 1.13   Monocytes 0.00 - 13.40 % 5.80   Monos (Absolute) 0.00 - 0.85 K/uL 0.98 (H)   Eosinophils 0.00 - 6.90 % 1.70   Eos (Absolute) 0.00 - 0.51 K/uL 0.29   Basophils 0.00 - 1.80 % 0.40   Baso (Absolute) 0.00 - 0.12 K/uL 0.06   Immature Granulocytes 0.00 - 0.90 % 0.60   Immature Granulocytes (abs) 0.00 - 0.11 K/uL 0.10   Nucleated RBC 0.00 - 0.20 /100 WBC 0.00   NRBC (Absolute) K/uL 0.00   Sodium 135 - 145 mmol/L 139   Potassium 3.6 - 5.5 mmol/L 3.8   Chloride 96 - 112 mmol/L 104   Co2 20 - 33 mmol/L 24   Anion Gap 7.0 - 16.0  11.0   Glucose 65 - 99 mg/dL 118 (H)   Bun 8 - 22 mg/dL 12   Creatinine 0.50 - 1.40 mg/dL 0.66   GFR (CKD-EPI) >60 mL/min/1.73 m 2  109   Calcium 8.5 - 10.5 mg/dL 9.5   Correct Calcium 8.5 - 10.5 mg/dL 9.0   AST(SGOT) 12 - 45 U/L 19   ALT(SGPT) 2 - 50 U/L 14   Alkaline Phosphatase 30 - 99 U/L 89   Total Bilirubin 0.1 - 1.5 mg/dL 0.4   Albumin 3.2 - 4.9 g/dL 4.6   Total Protein 6.0 - 8.2 g/dL 7.7   Globulin 1.9 - 3.5 g/dL 3.1   A-G Ratio g/dL 1.5   (H): Data is abnormally high  (L): Data is abnormally low  Assessment/Plan:     Diagnosis and associated orders:     1. Acute cystitis with hematuria  - CBC WITH DIFFERENTIAL; Future    2. Hematuria, unspecified type  - CBC WITH DIFFERENTIAL; Future  - Comp Metabolic Panel; Future  - Referral to Urology  - POCT Urinalysis  - URINE CULTURE(NEW); Future  - POCT Pregnancy    3. Leukocytosis, unspecified type  - CBC WITH DIFFERENTIAL; Future  - URINE CULTURE(NEW); Future          Comments/MDM:     The patient the patient presents to the urgent care for evaluation of new onset gross hematuria, urinary urgency, urinary hesitancy, urinary frequency, and dysuria.  She spoke with her primary care provider over the phone, who ordered her a 5-day prescription of nitrofurantoin.  She has taken 1 dose of this medication.  She has noticed that over the past 2 to 3 hours, her urine has become increasingly red and she is passing small clots.  POCT urinalysis in clinic demonstrates moderate leukocytes, protein, and a large amount of blood.  Negative nitrates.  CVA tenderness negative bilaterally.    Patient denies history of pyelonephritis, nephrolithiasis, coagulation disorders.    She reports recently completing a prescription for clindamycin for dog bite to her right hand.  Since completing this medication, she has had persistent fatigue and diarrhea.  Denies abdominal pain, fevers, nausea, or vomiting.  CBC and CMP drawn in clinic by APRN.  CBC demonstrates leukocytosis of 16.9.  The patient was called and all lab results were reviewed.  At this time, the patient politely elected to monitor her symptoms outpatient.     She will continue antibiotic prescription from PCP.  Urine culture in progress.  POCT pregnancy negative.  Due to gross hematuria, referral placed to urology for stat evaluation.  Imaging not indicated at this time.  Recommended strict ER precautions in the event that she developed worsening hematuria, abdominal pain, fever, chills, myalgias, nausea, flank pain, vomiting.  The differential was discussed with the patient at length.  Patient verbalizes strict ED restrictions and is in agreement with this plan of care.  Follow-up with PCP as needed         All questions answered. Patient verbalized understanding and is in agreement with this plan of care.     If symptoms are worsening or not improving in 3-5 days, follow-up with PCP or return to UC. Differential diagnosis, natural history, and supportive care discussed. AVS handout given and reviewed with patient. Patient educated on red flags and when to seek treatment back in ED or UC.     Time I spent evaluating the patient in urgent care today was 51 minutes. This time includes preparing for visit, reviewing any pertinent notes or test results, counseling/education, exam, obtaining HPI, interpretation of lab tests, medication management and documentation as indicated above. Time does not include separately billable procedures noted.      I personally reviewed prior external notes and test results pertinent to today's visit.  I have independently reviewed and interpreted all diagnostics ordered during this urgent care visit.     This dictation has been created using voice recognition software. The accuracy of the dictation is limited by the abilities of the software. I expect there may be some errors of grammar and possibly content. I made every attempt to manually correct the errors within my dictation. However, errors related to voice recognition software may still exist and should be interpreted within the appropriate context.    This note was electronically  signed by DEMARIO Wayne

## 2024-05-19 NOTE — ED TRIAGE NOTES
"Chief Complaint   Patient presents with    Blood in Urine     Started this am       Painful Urination     Started this am      Sent from Urgent Care     States had blood work done at  this am  Sent to ED for abnormal WBC     /77   Pulse (!) 101   Temp 36.6 °C (97.9 °F)   Resp 14   Ht 1.651 m (5' 5\")   Wt 51.3 kg (113 lb 1.5 oz)   LMP 05/15/2024   SpO2 95%   BMI 18.82 kg/m²     Pt ambulated to ED for c/o hematuria and dysuria started this am.  Pt was seen at  today, started on antibx, sent to ED for possible \"sepsis.\"  Pt states recent antibx for Cellulitis in RH from a dog bite.    "

## 2024-05-20 ENCOUNTER — APPOINTMENT (OUTPATIENT)
Dept: RADIOLOGY | Facility: MEDICAL CENTER | Age: 49
DRG: 690 | End: 2024-05-20
Attending: INTERNAL MEDICINE
Payer: COMMERCIAL

## 2024-05-20 LAB
ANION GAP SERPL CALC-SCNC: 9 MMOL/L (ref 7–16)
BUN SERPL-MCNC: 5 MG/DL (ref 8–22)
CALCIUM SERPL-MCNC: 8.6 MG/DL (ref 8.4–10.2)
CHLORIDE SERPL-SCNC: 110 MMOL/L (ref 96–112)
CO2 SERPL-SCNC: 23 MMOL/L (ref 20–33)
CREAT SERPL-MCNC: 0.59 MG/DL (ref 0.5–1.4)
ERYTHROCYTE [DISTWIDTH] IN BLOOD BY AUTOMATED COUNT: 39.6 FL (ref 35.9–50)
GFR SERPLBLD CREATININE-BSD FMLA CKD-EPI: 112 ML/MIN/1.73 M 2
GLUCOSE SERPL-MCNC: 107 MG/DL (ref 65–99)
HCT VFR BLD AUTO: 39.5 % (ref 37–47)
HGB BLD-MCNC: 13.3 G/DL (ref 12–16)
LACTATE SERPL-SCNC: 0.8 MMOL/L (ref 0.5–2)
LACTATE SERPL-SCNC: 1.3 MMOL/L (ref 0.5–2)
MCH RBC QN AUTO: 31.1 PG (ref 27–33)
MCHC RBC AUTO-ENTMCNC: 33.7 G/DL (ref 32.2–35.5)
MCV RBC AUTO: 92.3 FL (ref 81.4–97.8)
PLATELET # BLD AUTO: 246 K/UL (ref 164–446)
PMV BLD AUTO: 9.5 FL (ref 9–12.9)
POTASSIUM SERPL-SCNC: 3.5 MMOL/L (ref 3.6–5.5)
RBC # BLD AUTO: 4.28 M/UL (ref 4.2–5.4)
SODIUM SERPL-SCNC: 142 MMOL/L (ref 135–145)
WBC # BLD AUTO: 13.7 K/UL (ref 4.8–10.8)

## 2024-05-20 PROCEDURE — 99233 SBSQ HOSP IP/OBS HIGH 50: CPT | Performed by: INTERNAL MEDICINE

## 2024-05-20 RX ADMIN — CEFAZOLIN 2 G: 2 INJECTION, POWDER, FOR SOLUTION INTRAMUSCULAR; INTRAVENOUS at 14:44

## 2024-05-20 RX ADMIN — CEFAZOLIN 2 G: 2 INJECTION, POWDER, FOR SOLUTION INTRAMUSCULAR; INTRAVENOUS at 21:18

## 2024-05-20 RX ADMIN — ENOXAPARIN SODIUM 40 MG: 100 INJECTION SUBCUTANEOUS at 18:10

## 2024-05-20 RX ADMIN — SODIUM CHLORIDE: 9 INJECTION, SOLUTION INTRAVENOUS at 05:27

## 2024-05-20 RX ADMIN — ONDANSETRON 4 MG: 2 INJECTION INTRAMUSCULAR; INTRAVENOUS at 16:18

## 2024-05-20 ASSESSMENT — ENCOUNTER SYMPTOMS
SHORTNESS OF BREATH: 0
CHILLS: 0
DIARRHEA: 1
FEVER: 0
ABDOMINAL PAIN: 1
WEAKNESS: 0
COUGH: 0
DEPRESSION: 0
FOCAL WEAKNESS: 0
HEADACHES: 0
HEARTBURN: 0
NAUSEA: 1
SORE THROAT: 0
DIZZINESS: 0
PALPITATIONS: 0
HALLUCINATIONS: 0
VOMITING: 1
BACK PAIN: 0
FLANK PAIN: 1
BLOOD IN STOOL: 0
MYALGIAS: 0

## 2024-05-20 ASSESSMENT — PAIN DESCRIPTION - PAIN TYPE
TYPE: ACUTE PAIN
TYPE: ACUTE PAIN

## 2024-05-20 ASSESSMENT — FIBROSIS 4 INDEX: FIB4 SCORE: 0.9

## 2024-05-20 NOTE — ASSESSMENT & PLAN NOTE
Patient with acute pyelonephritis with left flank pain, fevers chills nausea vomiting and diarrhea    - Wbc 8.8, from 13.7 and 16.9. continue IV Ancef.  - pt requested to hold of a CT A/P for tomorrow if she is not improving.

## 2024-05-20 NOTE — CARE PLAN
The patient is Stable - Low risk of patient condition declining or worsening    Shift Goals  Clinical Goals: IV fluids, I/O's, stool sample  Patient Goals: feel better  Family Goals: comfort    Progress made toward(s) clinical / shift goals:    Problem: Knowledge Deficit - Standard  Goal: Patient and family/care givers will demonstrate understanding of plan of care, disease process/condition, diagnostic tests and medications  Description: Target End Date:  1-3 days or as soon as patient condition allows    Document in Patient Education    1.  Patient and family/caregiver oriented to unit, equipment, visitation policy and means for communicating concern  2.  Complete/review Learning Assessment  3.  Assess knowledge level of disease process/condition, treatment plan, diagnostic tests and medications  4.  Explain disease process/condition, treatment plan, diagnostic tests and medications  Outcome: Progressing  Note: Educated pt on medications, unit, and procedures     Problem: Pain - Standard  Goal: Alleviation of pain or a reduction in pain to the patient’s comfort goal  Description: Target End Date:  Prior to discharge or change in level of care    Document on Vitals flowsheet    1.  Document pain using the appropriate pain scale per order or unit policy  2.  Educate and implement non-pharmacologic comfort measures (i.e. relaxation, distraction, massage, cold/heat therapy, etc.)  3.  Pain management medications as ordered  4.  Reassess pain after pain med administration per policy  5.  If opiods administered assess patient's response to pain medication is appropriate per POSS sedation scale  6.  Follow pain management plan developed in collaboration with patient and interdisciplinary team (including palliative care or pain specialists if applicable)  Outcome: Progressing  Note: Pt declines needing pain medication at this time, pain relieved with rest, aware to inform RN of breakthrough pain.        Patient is not  progressing towards the following goals:

## 2024-05-20 NOTE — ED NOTES
Per , occult blood stoolx3 is no longer a valid order. New order placed for occult blood stool ( x3).

## 2024-05-20 NOTE — H&P
Hospital Medicine History & Physical Note    Date of Service  5/19/2024    Primary Care Physician  Delilah Lee M.D.    Consultants  None    Specialist Names: None    Code Status  Full Code    Chief Complaint  Chief Complaint   Patient presents with    Blood in Urine     Started this am       Painful Urination     Started this am      Sent from Urgent Care     States had blood work done at  this am  Sent to ED for abnormal WBC       History of Presenting Illness  Karis Gerardo is a 48 y.o. female who presented 5/19/2024 with fevers, chills, left flank pain.  The patient recently had a trip to [place] where she required diarrhea.  The diarrhea lasted about 5 days and she did not seek any medical treatment for it or took any antibiotics for it.  The patient then suffered a dog bite and she was initially put on doxycycline for this.  The wound was not healing so she was switched over to clindamycin.  Afterwards the patient developed flank pain on the left side this was accompanied with painful urination and so she went to see her primary care physician who put her on nitrofurantoin and this was not effective.  She now comes in with fevers chills nausea diarrhea and worsening urinary symptoms.  The patient now has a pyelonephritis and will be admitted for treatment with fluid resuscitation pain management and IV Rocephin.    I discussed the plan of care with patient, family, bedside RN, and emergency room physician Dr. Alexander Henry .    Review of Systems  Review of Systems   Constitutional:  Positive for chills, diaphoresis, fever and malaise/fatigue.   HENT: Negative.  Negative for hearing loss, nosebleeds and sinus pain.    Eyes: Negative.  Negative for double vision, photophobia, discharge and redness.   Respiratory:  Positive for shortness of breath. Negative for cough, sputum production, wheezing and stridor.    Cardiovascular: Negative.  Negative for chest pain, orthopnea, leg swelling and PND.    Gastrointestinal:  Positive for abdominal pain, diarrhea and nausea. Negative for blood in stool and heartburn.   Genitourinary:  Positive for flank pain and frequency. Negative for dysuria.   Musculoskeletal:  Positive for myalgias. Negative for back pain and falls.   Skin: Negative.  Negative for itching.   Neurological:  Positive for dizziness. Negative for tingling, sensory change, focal weakness and seizures.   Endo/Heme/Allergies: Negative.  Negative for polydipsia. Does not bruise/bleed easily.   Psychiatric/Behavioral: Negative.  Negative for depression, substance abuse and suicidal ideas. The patient does not have insomnia.    All other systems reviewed and are negative.      Past Medical History   has a past medical history of Patient denies medical problems.    Surgical History   has a past surgical history that includes nasal reconstruction.     Family History  family history includes Cancer in her paternal aunt; Hyperlipidemia in her sister; Hypertension in her father.   Family history reviewed with patient. There is no family history that is pertinent to the chief complaint.     Social History   reports that she has never smoked. She has never used smokeless tobacco. She reports current alcohol use. She reports that she does not currently use drugs.    Allergies  Allergies   Allergen Reactions    Pcn [Penicillins] Hives     RXN=as a child    Sulfa Drugs Hives     RXN=as a child       Medications  Prior to Admission Medications   Prescriptions Last Dose Informant Patient Reported? Taking?   ALPHA LIPOIC ACID PO > 2 weeks at Unknown Patient Yes Yes   Sig: Take 1 Capsule by mouth every day. (OTC)   Aspirin 81 MG Cap > 2.5 weeks at Unknown Patient Yes No   Sig: Take 81 mg by mouth see administration instructions. Pt takes on Sun and Thur   azelastine (ASTELIN) 137 MCG/SPRAY nasal spray > 2 weeks at Unknown Patient Yes Yes   Sig: Administer 1 Spray into affected nostril(S) 1 time a day as needed.  Indications: Hayfever   clindamycin (CLEOCIN) 150 MG Cap 5/15/2024 at FINISHED Patient Yes Yes   Sig: Take 150 mg by mouth 4 times a day. Pt started on 5/10/2024 for 5 day course   doxycycline (VIBRAMYCIN) 100 MG Tab 5/10/2024 at STOPPED Patient Yes Yes   Sig: Take 100 mg by mouth 2 times a day. Pt started on 5/9/2024 for 7 day course, per pt only took for 2 dose and stopped   ibuprofen (MOTRIN) 200 MG Tab 5/10/2024 at Unknown Patient Yes Yes   Sig: Take 100 mg by mouth every 6 hours as needed. Indications: Pain   loratadine (CLARITIN) 10 MG Tab 5/18/2024 at 1200 Patient Yes Yes   Sig: Take 10 mg by mouth 1 time a day as needed. Indications: Hayfever   multivitamin Tab 5/17/2024 at AM Patient Yes Yes   Sig: Take 1 Tablet by mouth every day.   phenazopyridine (PYRIDIUM) 200 MG Tab NEW RX Patient No No   Sig: Take 1 Tablet by mouth 3 times a day for 2 days.   tretinoin (RETIN-A) 0.025 % cream > 1 week at Unknown Patient Yes No   Sig: Apply 1 Application topically see administration instructions. Pt uses 3 times a week, no set days      Facility-Administered Medications: None       Physical Exam  Temp:  [36.6 °C (97.9 °F)-37.1 °C (98.8 °F)] 37.1 °C (98.7 °F)  Pulse:  [] 93  Resp:  [14-18] 18  BP: (117-129)/(67-82) 124/82  SpO2:  [95 %-99 %] 96 %  Blood Pressure: 124/82   Temperature: 37.1 °C (98.7 °F)   Pulse: 93   Respiration: 18   Pulse Oximetry: 96 %       Physical Exam  Vitals and nursing note reviewed. Exam conducted with a chaperone present.   Constitutional:       General: She is awake.      Appearance: Normal appearance. She is well-developed, well-groomed and normal weight. She is ill-appearing.   HENT:      Head: Normocephalic and atraumatic.      Jaw: There is normal jaw occlusion. No trismus.      Salivary Glands: Right salivary gland is not tender. Left salivary gland is not tender.      Right Ear: External ear normal.      Left Ear: External ear normal.      Nose: Nose normal.      Mouth/Throat:       Mouth: Mucous membranes are dry.      Pharynx: Oropharynx is clear. No oropharyngeal exudate or posterior oropharyngeal erythema.   Eyes:      General: Lids are normal. Vision grossly intact.         Right eye: No discharge.         Left eye: No discharge.      Extraocular Movements: Extraocular movements intact.      Conjunctiva/sclera: Conjunctivae normal.      Right eye: Right conjunctiva is not injected. No exudate.     Left eye: Left conjunctiva is not injected. No exudate.     Pupils: Pupils are equal, round, and reactive to light.   Neck:      Thyroid: No thyroid mass.      Vascular: No hepatojugular reflux or JVD.      Trachea: No abnormal tracheal secretions or tracheal deviation.   Cardiovascular:      Rate and Rhythm: Regular rhythm. Tachycardia present. Occasional Extrasystoles are present.     Pulses: Normal pulses.      Heart sounds: Normal heart sounds. No murmur heard.     No friction rub.   Pulmonary:      Effort: Pulmonary effort is normal.      Breath sounds: Normal breath sounds. No wheezing or rhonchi.   Abdominal:      General: Abdomen is flat. Bowel sounds are normal.      Palpations: Abdomen is soft.      Tenderness: There is no abdominal tenderness. There is no right CVA tenderness or left CVA tenderness.      Hernia: No hernia is present.   Musculoskeletal:         General: Normal range of motion.      Cervical back: Full passive range of motion without pain, normal range of motion and neck supple. No rigidity. No muscular tenderness.      Right lower leg: No edema.      Left lower leg: No edema.      Comments: Left flank pain present   Lymphadenopathy:      Head:      Right side of head: No submental adenopathy.      Left side of head: No submental adenopathy.      Cervical:      Right cervical: No superficial cervical adenopathy.     Left cervical: No superficial cervical adenopathy.      Upper Body:      Right upper body: No supraclavicular adenopathy.      Left upper body: No  supraclavicular adenopathy.   Skin:     General: Skin is warm and dry.      Capillary Refill: Capillary refill takes less than 2 seconds.      Coloration: Skin is not cyanotic or pale.      Findings: No abrasion or bruising.   Neurological:      General: No focal deficit present.      Mental Status: She is alert and oriented to person, place, and time. Mental status is at baseline.      GCS: GCS eye subscore is 4. GCS verbal subscore is 5. GCS motor subscore is 6.      Cranial Nerves: No cranial nerve deficit.      Sensory: No sensory deficit.      Motor: Motor function is intact.      Deep Tendon Reflexes:      Reflex Scores:       Tricep reflexes are 2+ on the right side and 2+ on the left side.       Bicep reflexes are 2+ on the right side and 2+ on the left side.       Brachioradialis reflexes are 2+ on the right side and 2+ on the left side.       Patellar reflexes are 2+ on the right side and 2+ on the left side.       Achilles reflexes are 2+ on the right side and 2+ on the left side.  Psychiatric:         Attention and Perception: Attention and perception normal.         Mood and Affect: Mood normal.         Speech: Speech normal.         Behavior: Behavior normal. Behavior is cooperative.         Thought Content: Thought content normal.         Cognition and Memory: Cognition and memory normal.         Judgment: Judgment normal.         Laboratory:  Recent Labs     05/19/24  1229 05/19/24  1530   WBC 16.9* 16.6*   RBC 5.19 4.84   HEMOGLOBIN 16.4* 14.9   HEMATOCRIT 47.9* 44.7   MCV 92.3 92.4   MCH 31.6 30.8   MCHC 34.2 33.3   RDW 40.9 39.1   PLATELETCT 298 269   MPV 9.6 9.5     Recent Labs     05/19/24  1229 05/19/24  1530   SODIUM 139 136   POTASSIUM 3.8 3.6   CHLORIDE 104 103   CO2 24 22   GLUCOSE 118* 115*   BUN 12 10   CREATININE 0.66 0.62   CALCIUM 9.5 9.1     Recent Labs     05/19/24  1229 05/19/24  1530   ALTSGPT 14 14   ASTSGOT 19 18   ALKPHOSPHAT 89 88   TBILIRUBIN 0.4 0.5   GLUCOSE 118* 115*        "  No results for input(s): \"NTPROBNP\" in the last 72 hours.      No results for input(s): \"TROPONINT\" in the last 72 hours.    Imaging:  No orders to display       X-Ray:  I have personally reviewed the images and compared with prior images.  EKG:  I have personally reviewed the images and compared with prior images.    Assessment/Plan:  Justification for Admission Status  I anticipate this patient will require at least two midnights for appropriate medical management, necessitating inpatient admission because patient has left flank pain with acute pyelonephritis.  Patient will need at least 48 hours of inpatient management including antibiotics, fluid resuscitation, monitoring of renal functions, pain management and control of diarrhea    Patient will need a Telemetry bed on MEDICAL service .  The need is secondary to acute pyelonephritis.    * Acute pyelonephritis- (present on admission)  Assessment & Plan  Patient with acute pyelonephritis with left flank pain, fevers chills nausea vomiting and diarrhea  Patient will be on IV antibiotics with Rocephin  Blood cultures x 2  Urine culture  Obtain procalcitonin level  Obtain serial lactic acid levels  Fluid resuscitation  Pain management    Diarrhea- (present on admission)  Assessment & Plan  Patient has diarrhea initially that started about 3 weeks ago when she was in [place].  Lasted about 5 days and then resolved.  Now diarrhea again started up as the patient has been on doxycycline initially and then clindamycin and then nitrofurantoin  Check fecal lactoferrin, stool culture, occult blood x 3  Fluid resuscitation    SIRS (systemic inflammatory response syndrome) (HCC)- (present on admission)  Assessment & Plan  SIRS criteria identified on my evaluation include:  Fever, with temperature greater than 100.9 deg F, Tachycardia, with heart rate greater than 90 BPM, and Leukocytosis, with WBC greater than 12,000  SIRS is non-infectious, the patient does not have " sepsis      Leukocytosis- (present on admission)  Assessment & Plan  White blood cell count currently 16,600  Continue to monitor white blood cell count  Continue with antibiotics    Full code status- (present on admission)  Assessment & Plan  As discussed with patient advanced directives she is full CODE STATUS        VTE prophylaxis: SCDs/TEDs

## 2024-05-20 NOTE — PROGRESS NOTES
Telemetry Shift Summary     Rhythm: SR/ST  HR:   Ectopy: -  Measurements: 0.18/0.08/0.40       Normal Values  Rhythm: SR  HR:   Measurements: 0.12-0.20 / 0.04-0.10 / 0.30-0.52

## 2024-05-20 NOTE — PROGRESS NOTES
Hospital Medicine Daily Progress Note    Date of Service  5/20/2024    Chief Complaint  Karis Gerardo is a 48 y.o. female admitted 5/19/2024 with flank pain, vomiting    Hospital Course  History of recent dog bite treated with doxycycline, history of recent travel to [place] presented with diarrhea, abdominal/flank pain, dysuria and vomiting.  She was found to have an abnormal UA and SIRS consistent with pyelonephritis.  She started empiric ceftriaxone therapy    Interval Problem Update  5/20: WBC slightly trending down, she states pain in the abdomen is worse, migrating to the right flank.  Ultrasound ordered.  Lactate has normalized, tolerating ceftriaxone, continue IV fluids.  Follow-up stool studies and urine cx    I have discussed this patient's plan of care and discharge plan at IDT rounds today with Case Management, Nursing, Nursing leadership, and other members of the IDT team.    Consultants/Specialty  None    Code Status  Full Code    Disposition  The patient is not medically cleared for discharge to home or a post-acute facility.  Anticipate discharge to: home with close outpatient follow-up    I have placed the appropriate orders for post-discharge needs.    Review of Systems  Review of Systems   Constitutional:  Positive for malaise/fatigue. Negative for chills and fever.   HENT:  Negative for sore throat.    Respiratory:  Negative for cough and shortness of breath.    Cardiovascular:  Negative for chest pain and palpitations.   Gastrointestinal:  Positive for abdominal pain, diarrhea, nausea and vomiting. Negative for blood in stool and heartburn.   Genitourinary:  Positive for flank pain. Negative for dysuria and frequency.   Musculoskeletal:  Negative for back pain and myalgias.   Neurological:  Negative for dizziness, focal weakness, weakness and headaches.   Psychiatric/Behavioral:  Negative for depression and hallucinations.    All other systems reviewed and are negative.       Physical  Exam  Temp:  [36.2 °C (97.2 °F)-37.1 °C (98.8 °F)] 36.8 °C (98.3 °F)  Pulse:  [] 86  Resp:  [14-18] 18  BP: (100-129)/(61-82) 112/68  SpO2:  [94 %-99 %] 96 %    Physical Exam  Constitutional:       General: She is not in acute distress.  HENT:      Nose: Nose normal.      Mouth/Throat:      Mouth: Mucous membranes are dry.   Cardiovascular:      Rate and Rhythm: Normal rate and regular rhythm.      Pulses: Normal pulses.   Pulmonary:      Effort: Pulmonary effort is normal. No respiratory distress.      Breath sounds: Normal breath sounds.   Abdominal:      General: There is no distension.      Palpations: Abdomen is soft.      Tenderness: There is abdominal tenderness. There is right CVA tenderness and left CVA tenderness.   Musculoskeletal:         General: No swelling.      Cervical back: No muscular tenderness.   Lymphadenopathy:      Cervical: No cervical adenopathy.   Skin:     General: Skin is warm and dry.      Coloration: Skin is pale.      Findings: No rash.   Neurological:      General: No focal deficit present.      Mental Status: She is alert and oriented to person, place, and time.      Motor: Weakness present.   Psychiatric:         Mood and Affect: Mood normal.         Thought Content: Thought content normal.         Fluids    Intake/Output Summary (Last 24 hours) at 5/20/2024 1118  Last data filed at 5/20/2024 1000  Gross per 24 hour   Intake 220 ml   Output 1950 ml   Net -1730 ml       Laboratory  Recent Labs     05/19/24  1229 05/19/24  1530 05/20/24  0133   WBC 16.9* 16.6* 13.7*   RBC 5.19 4.84 4.28   HEMOGLOBIN 16.4* 14.9 13.3   HEMATOCRIT 47.9* 44.7 39.5   MCV 92.3 92.4 92.3   MCH 31.6 30.8 31.1   MCHC 34.2 33.3 33.7   RDW 40.9 39.1 39.6   PLATELETCT 298 269 246   MPV 9.6 9.5 9.5     Recent Labs     05/19/24  1229 05/19/24  1530 05/20/24  0133   SODIUM 139 136 142   POTASSIUM 3.8 3.6 3.5*   CHLORIDE 104 103 110   CO2 24 22 23   GLUCOSE 118* 115* 107*   BUN 12 10 5*   CREATININE 0.66 0.62  0.59   CALCIUM 9.5 9.1 8.6                   Imaging  US-ABDOMEN COMPLETE SURVEY    (Results Pending)        Assessment/Plan  * Acute pyelonephritis- (present on admission)  Assessment & Plan  Patient with acute pyelonephritis with left flank pain, fevers chills nausea vomiting and diarrhea  Pain is moving to the right today  Follow urine culture and blood cultures x 2  Procalcitonin negative  Initial lactate elevated, trended down to normal  Fluid resuscitation  Order ultrasound abdomen pelvis given persistent pain, migrating to the right  Continue ceftriaxone    Diarrhea- (present on admission)  Assessment & Plan  Patient has diarrhea initially that started about 3 weeks ago when she was in [place].  Lasted about 5 days and then resolved.  Now diarrhea again started up as the patient has been on doxycycline initially and then clindamycin and then nitrofurantoin  Check fecal lactoferrin, stool culture, occult blood x 3  Fluid resuscitation    SIRS (systemic inflammatory response syndrome) (HCC)- (present on admission)  Assessment & Plan  SIRS criteria identified on my evaluation include:  Fever, with temperature greater than 100.9 deg F, Tachycardia, with heart rate greater than 90 BPM, and Leukocytosis, with WBC greater than 12,000  SIRS is non-infectious, the patient does not have sepsis      Leukocytosis- (present on admission)  Assessment & Plan  Improved slightly today  Continue to monitor white blood cell count  Continue with ceftriaxone         VTE prophylaxis: Lovenox    I have performed a physical exam and reviewed and updated ROS and Plan today (5/20/2024). In review of yesterday's note (5/19/2024), there are no changes except as documented above.      Total time:  51 minutes.  I spent greater than 50% of the time for patient care, counseling, and coordination on this date, including unit/floor time, and face-to-face time with the patient as per interval events and assessment and plan above

## 2024-05-20 NOTE — ASSESSMENT & PLAN NOTE
Patient has diarrhea initially that started about 3 weeks ago when she was in [place].  Lasted about 5 days and then resolved.  Diarrhea again started up as the patient has been on doxycycline initially and then clindamycin and then nitrofurantoin    C difficile PCR pending

## 2024-05-20 NOTE — HOSPITAL COURSE
History of recent dog bite treated with doxycycline, history of recent travel to [place] presented with diarrhea, abdominal/flank pain, dysuria and vomiting.  She was found to have an abnormal UA and SIRS consistent with pyelonephritis.  She started empiric ceftriaxone therapy

## 2024-05-20 NOTE — ASSESSMENT & PLAN NOTE
SIRS criteria identified on my evaluation include:  Fever, with temperature greater than 100.9 deg F, Tachycardia, with heart rate greater than 90 BPM, and Leukocytosis, with WBC greater than 12,000  SIRS is non-infectious, the patient does not have sepsis  - Wbc 8.8, from 13.7 and 16.9. continue IV Ancef.

## 2024-05-20 NOTE — PROGRESS NOTES
4 Eyes Skin Assessment Completed by FRANCISCO ontiveros and FRANCISCO Maloney.    Head WDL  Ears WDL  Nose WDL  Mouth WDL  Neck WDL  Breast/Chest WDL  Shoulder Blades WDL  Spine WDL  (R) Arm/Elbow/Hand Scab hand  (L) Arm/Elbow/Hand WDL  Abdomen WDL  Groin WDL  Scrotum/Coccyx/Buttocks WDL  (R) Leg WDL  (L) Leg WDL  (R) Heel/Foot/Toe WDL  (L) Heel/Foot/Toe WDL          Devices In Places Tele Box      Interventions In Place N/A    Possible Skin Injury No    Pictures Uploaded Into Epic N/A  Wound Consult Placed N/A  RN Wound Prevention Protocol Ordered No

## 2024-05-20 NOTE — ANTIMICROBIAL STEWARDSHIP
Antimicrobial Stewardship Rounds Note    Date  5/20/2024    Assessment  Patient chart reviewed during antimicrobial stewardship rounds with antimicrobial stewardship medical director Dr. Deangelo Garza. Patient currently on ceftriaxone for left pyelonephritis (left flank pain, fever, dysuria). Patient reports recent trip to [place] when she had diarrhea; she has taken several courses of antibiotics, including doxycycline, clindamycin, and nitrofurantoin per notes - patient is currently being ruled out for C. Difficile infection. WBC 16.9>13.7, procalcitonin 0.02, afebrile here thus far, low suspicion for bacteremia. No history of drug-resistant organisms per culture history - low suspicion for drug-resistant gram negative pathogens. Cefazolin provides excellent coverage of E. Coli from urine sources (which we suspect is the most likely pathogen), and is recommended in [facility]'s UTI order set as first-line for clinically stable patients with pyelonephritis who have no history of drug-resistant organisms. Cefazolin also carries lower risk of C. Difficile infection than ceftriaxone.     Recommendation  Based on the assessment above, the antimicrobial stewardship team recommends stopping ceftriaxone and starting cefazolin 2g IV q8h. Discussed with Dr. Aguirre, who agreed. Orders were updated in the chart by attending physician.     Angelina Hanna, PharmD, BCPS, BCIDP  Infectious Diseases Pharmacy Clinical Specialist

## 2024-05-21 PROBLEM — R11.2 NAUSEA AND VOMITING: Chronic | Status: ACTIVE | Noted: 2024-05-21

## 2024-05-21 PROBLEM — E87.6 HYPOKALEMIA: Status: ACTIVE | Noted: 2024-05-21

## 2024-05-21 PROBLEM — Z88.0 PENICILLIN ALLERGY: Chronic | Status: ACTIVE | Noted: 2024-05-21

## 2024-05-21 LAB
ALBUMIN SERPL BCP-MCNC: 3.6 G/DL (ref 3.2–4.9)
BACTERIA UR CULT: NORMAL
BACTERIA UR CULT: NORMAL
BASOPHILS # BLD AUTO: 0.6 % (ref 0–1.8)
BASOPHILS # BLD: 0.05 K/UL (ref 0–0.12)
BUN SERPL-MCNC: 5 MG/DL (ref 8–22)
C DIFF DNA SPEC QL NAA+PROBE: NEGATIVE
C DIFF TOX GENS STL QL NAA+PROBE: NEGATIVE
CALCIUM ALBUM COR SERPL-MCNC: 8.9 MG/DL (ref 8.5–10.5)
CALCIUM SERPL-MCNC: 8.6 MG/DL (ref 8.4–10.2)
CHLORIDE SERPL-SCNC: 109 MMOL/L (ref 96–112)
CO2 SERPL-SCNC: 23 MMOL/L (ref 20–33)
CREAT SERPL-MCNC: 0.61 MG/DL (ref 0.5–1.4)
EOSINOPHIL # BLD AUTO: 0.23 K/UL (ref 0–0.51)
EOSINOPHIL NFR BLD: 2.6 % (ref 0–6.9)
ERYTHROCYTE [DISTWIDTH] IN BLOOD BY AUTOMATED COUNT: 41.5 FL (ref 35.9–50)
GFR SERPLBLD CREATININE-BSD FMLA CKD-EPI: 111 ML/MIN/1.73 M 2
GLUCOSE SERPL-MCNC: 81 MG/DL (ref 65–99)
HCT VFR BLD AUTO: 39.2 % (ref 37–47)
HGB BLD-MCNC: 12.9 G/DL (ref 12–16)
IMM GRANULOCYTES # BLD AUTO: 0.03 K/UL (ref 0–0.11)
IMM GRANULOCYTES NFR BLD AUTO: 0.3 % (ref 0–0.9)
LYMPHOCYTES # BLD AUTO: 2.21 K/UL (ref 1–4.8)
LYMPHOCYTES NFR BLD: 25.2 % (ref 22–41)
MAGNESIUM SERPL-MCNC: 2 MG/DL (ref 1.5–2.5)
MCH RBC QN AUTO: 31.2 PG (ref 27–33)
MCHC RBC AUTO-ENTMCNC: 32.9 G/DL (ref 32.2–35.5)
MCV RBC AUTO: 94.7 FL (ref 81.4–97.8)
MONOCYTES # BLD AUTO: 0.72 K/UL (ref 0–0.85)
MONOCYTES NFR BLD AUTO: 8.2 % (ref 0–13.4)
NEUTROPHILS # BLD AUTO: 5.54 K/UL (ref 1.82–7.42)
NEUTROPHILS NFR BLD: 63.1 % (ref 44–72)
NRBC # BLD AUTO: 0 K/UL
NRBC BLD-RTO: 0 /100 WBC (ref 0–0.2)
PHOSPHATE SERPL-MCNC: 2.8 MG/DL (ref 2.5–4.5)
PLATELET # BLD AUTO: 226 K/UL (ref 164–446)
PMV BLD AUTO: 10.1 FL (ref 9–12.9)
POTASSIUM SERPL-SCNC: 3.2 MMOL/L (ref 3.6–5.5)
RBC # BLD AUTO: 4.14 M/UL (ref 4.2–5.4)
SIGNIFICANT IND 70042: NORMAL
SIGNIFICANT IND 70042: NORMAL
SITE SITE: NORMAL
SITE SITE: NORMAL
SODIUM SERPL-SCNC: 141 MMOL/L (ref 135–145)
SOURCE SOURCE: NORMAL
SOURCE SOURCE: NORMAL
WBC # BLD AUTO: 8.8 K/UL (ref 4.8–10.8)

## 2024-05-21 PROCEDURE — 99233 SBSQ HOSP IP/OBS HIGH 50: CPT | Performed by: INTERNAL MEDICINE

## 2024-05-21 RX ORDER — POTASSIUM CHLORIDE 7.45 MG/ML
10 INJECTION INTRAVENOUS
Status: COMPLETED | OUTPATIENT
Start: 2024-05-21 | End: 2024-05-21

## 2024-05-21 RX ORDER — FAMOTIDINE 20 MG/1
20 TABLET, FILM COATED ORAL 2 TIMES DAILY
Status: COMPLETED | OUTPATIENT
Start: 2024-05-21 | End: 2024-05-22

## 2024-05-21 RX ORDER — LORATADINE 10 MG/1
10 TABLET ORAL DAILY
Status: DISCONTINUED | OUTPATIENT
Start: 2024-05-21 | End: 2024-05-22 | Stop reason: HOSPADM

## 2024-05-21 RX ORDER — FAMOTIDINE 20 MG/1
20 TABLET, FILM COATED ORAL 2 TIMES DAILY
Status: DISCONTINUED | OUTPATIENT
Start: 2024-05-22 | End: 2024-05-21

## 2024-05-21 RX ADMIN — SODIUM CHLORIDE: 9 INJECTION, SOLUTION INTRAVENOUS at 22:31

## 2024-05-21 RX ADMIN — SODIUM CHLORIDE: 9 INJECTION, SOLUTION INTRAVENOUS at 01:49

## 2024-05-21 RX ADMIN — POTASSIUM BICARBONATE 50 MEQ: 978 TABLET, EFFERVESCENT ORAL at 08:23

## 2024-05-21 RX ADMIN — ENOXAPARIN SODIUM 40 MG: 100 INJECTION SUBCUTANEOUS at 17:16

## 2024-05-21 RX ADMIN — FAMOTIDINE 20 MG: 10 INJECTION INTRAVENOUS at 14:42

## 2024-05-21 RX ADMIN — POTASSIUM CHLORIDE 10 MEQ: 10 INJECTION, SOLUTION INTRAVENOUS at 17:52

## 2024-05-21 RX ADMIN — CEFAZOLIN 2 G: 2 INJECTION, POWDER, FOR SOLUTION INTRAMUSCULAR; INTRAVENOUS at 13:12

## 2024-05-21 RX ADMIN — SODIUM CHLORIDE: 9 INJECTION, SOLUTION INTRAVENOUS at 12:28

## 2024-05-21 RX ADMIN — CEFAZOLIN 2 G: 2 INJECTION, POWDER, FOR SOLUTION INTRAMUSCULAR; INTRAVENOUS at 21:25

## 2024-05-21 RX ADMIN — CEFAZOLIN 2 G: 2 INJECTION, POWDER, FOR SOLUTION INTRAMUSCULAR; INTRAVENOUS at 04:45

## 2024-05-21 RX ADMIN — POTASSIUM CHLORIDE 10 MEQ: 10 INJECTION, SOLUTION INTRAVENOUS at 13:56

## 2024-05-21 RX ADMIN — LORATADINE 10 MG: 10 TABLET ORAL at 17:16

## 2024-05-21 RX ADMIN — POTASSIUM CHLORIDE 10 MEQ: 10 INJECTION, SOLUTION INTRAVENOUS at 15:28

## 2024-05-21 RX ADMIN — POTASSIUM CHLORIDE 10 MEQ: 10 INJECTION, SOLUTION INTRAVENOUS at 16:37

## 2024-05-21 RX ADMIN — ONDANSETRON 4 MG: 2 INJECTION INTRAMUSCULAR; INTRAVENOUS at 08:23

## 2024-05-21 ASSESSMENT — ENCOUNTER SYMPTOMS
NAUSEA: 1
FLANK PAIN: 1
VOMITING: 1

## 2024-05-21 ASSESSMENT — PAIN DESCRIPTION - PAIN TYPE: TYPE: ACUTE PAIN

## 2024-05-21 NOTE — CARE PLAN
The patient is Stable - Low risk of patient condition declining or worsening    Shift Goals  Clinical Goals: I/O's, IV ABX, stool sample  Patient Goals: have BM  Family Goals: comfort    Progress made toward(s) clinical / shift goals:    Problem: Knowledge Deficit - Standard  Goal: Patient and family/care givers will demonstrate understanding of plan of care, disease process/condition, diagnostic tests and medications  Description: Target End Date:  1-3 days or as soon as patient condition allows    Document in Patient Education    1.  Patient and family/caregiver oriented to unit, equipment, visitation policy and means for communicating concern  2.  Complete/review Learning Assessment  3.  Assess knowledge level of disease process/condition, treatment plan, diagnostic tests and medications  4.  Explain disease process/condition, treatment plan, diagnostic tests and medications  Outcome: Progressing  Note: Educated on medications, diet, and isolation precautions.      Problem: Pain - Standard  Goal: Alleviation of pain or a reduction in pain to the patient’s comfort goal  Description: Target End Date:  Prior to discharge or change in level of care    Document on Vitals flowsheet    1.  Document pain using the appropriate pain scale per order or unit policy  2.  Educate and implement non-pharmacologic comfort measures (i.e. relaxation, distraction, massage, cold/heat therapy, etc.)  3.  Pain management medications as ordered  4.  Reassess pain after pain med administration per policy  5.  If opiods administered assess patient's response to pain medication is appropriate per POSS sedation scale  6.  Follow pain management plan developed in collaboration with patient and interdisciplinary team (including palliative care or pain specialists if applicable)  Outcome: Progressing  Note: Pt currently declines pain, aware to inform RN of breakthrough pain     Problem: Fall Risk  Goal: Patient will remain free from  falls  Description: Target End Date:  Prior to discharge or change in level of care    Document interventions on the Arlen Pederson Fall Risk Assessment    1.  Assess for fall risk factors  2.  Implement fall precautions  Outcome: Progressing  Note: Pt ambulating appropriately, calls for assistance.       Patient is not progressing towards the following goals:

## 2024-05-21 NOTE — CARE PLAN
The patient is Stable - Low risk of patient condition declining or worsening    Shift Goals  Clinical Goals: control nausea, increase PO intake  Patient Goals: control nausea, go home  Family Goals: comfort    Progress made toward(s) clinical / shift goals:  Less nauseated after taking IV famotidine. Able to eat yogurt for lunch. Goal to increase PO intake. Stool sample sent earlier, Solid stool, no diarrhea.  Problem: Knowledge Deficit - Standard  Goal: Patient and family/care givers will demonstrate understanding of plan of care, disease process/condition, diagnostic tests and medications  Outcome: Progressing     Problem: Pain - Standard  Goal: Alleviation of pain or a reduction in pain to the patient’s comfort goal  Outcome: Progressing     Problem: Fall Risk  Goal: Patient will remain free from falls  Outcome: Progressing       Patient is not progressing towards the following goals:

## 2024-05-21 NOTE — PROGRESS NOTES
Telemetry Shift Summary     Rhythm: SB/SR  HR: 48-75  Ectopy: rPAC  Measurements: 0.18/0.08/0.40       Normal Values  Rhythm: SR  HR:   Measurements: 0.12-0.20 / 0.04-0.10 / 0.30-0.52

## 2024-05-21 NOTE — PROGRESS NOTES
Tele strip at 1131 shows SR at 65.      Measurements from am strip were as follows:  LA=0.16  QRS=0.08   QT=0.36    Tele Shift Summary:    Rhythm : SR  Rate : 73-89   Ectopy : Per CCT Alexa, pt had no ectopy.     Telemetry monitoring strips placed in pt's chart.

## 2024-05-21 NOTE — CARE PLAN
Problem: Knowledge Deficit - Standard  Goal: Patient and family/care givers will demonstrate understanding of plan of care, disease process/condition, diagnostic tests and medications  Outcome: Not Progressing  Note: Pt still having N/V; small amount of clear emesis.  Has only been taking small sips.      Problem: Fall Risk  Goal: Patient will remain free from falls  Outcome: Progressing  Note: Pt still feels dizzy; has been calling appropriately to get up to the bathroom.    The patient is Stable - Low risk of patient condition declining or worsening    Shift Goals  Clinical Goals: Monitor I&Os, stool sample  Patient Goals: Feel Better and rest  Family Goals: comfort    Progress made toward(s) clinical / shift goals:  pt able to rest on and off    Patient is not progressing towards the following goals:  pt not keeping much down, US of abdomen obtained      Problem: Knowledge Deficit - Standard  Goal: Patient and family/care givers will demonstrate understanding of plan of care, disease process/condition, diagnostic tests and medications  Outcome: Not Progressing  Note: Pt still having N/V; small amount of clear emesis.  Has only been taking small sips.

## 2024-05-21 NOTE — PROGRESS NOTES
Telemetry Shift Summary     Rhythm SR/SB  HR Range 57-76  Ectopy R PACS  Measurements  .16/.08/.40  Per strip printed 1600     Normal Values  Rhythm SR  HR Range    Measurements 0.12-0.20 / 0.06-0.10  / 0.30-0.52

## 2024-05-22 ENCOUNTER — APPOINTMENT (OUTPATIENT)
Dept: RADIOLOGY | Facility: MEDICAL CENTER | Age: 49
DRG: 690 | End: 2024-05-22
Attending: INTERNAL MEDICINE
Payer: COMMERCIAL

## 2024-05-22 VITALS
WEIGHT: 127.65 LBS | SYSTOLIC BLOOD PRESSURE: 103 MMHG | DIASTOLIC BLOOD PRESSURE: 73 MMHG | HEIGHT: 65 IN | OXYGEN SATURATION: 96 % | TEMPERATURE: 97.6 F | BODY MASS INDEX: 21.27 KG/M2 | HEART RATE: 92 BPM | RESPIRATION RATE: 18 BRPM

## 2024-05-22 PROBLEM — N10 ACUTE PYELONEPHRITIS: Status: RESOLVED | Noted: 2024-05-19 | Resolved: 2024-05-22

## 2024-05-22 PROBLEM — R65.10 SIRS (SYSTEMIC INFLAMMATORY RESPONSE SYNDROME) (HCC): Status: RESOLVED | Noted: 2024-05-19 | Resolved: 2024-05-22

## 2024-05-22 PROBLEM — R11.2 NAUSEA AND VOMITING: Chronic | Status: RESOLVED | Noted: 2024-05-21 | Resolved: 2024-05-22

## 2024-05-22 PROBLEM — R19.7 DIARRHEA: Status: RESOLVED | Noted: 2024-05-19 | Resolved: 2024-05-22

## 2024-05-22 PROBLEM — E87.6 HYPOKALEMIA: Status: RESOLVED | Noted: 2024-05-21 | Resolved: 2024-05-22

## 2024-05-22 PROBLEM — D72.829 LEUKOCYTOSIS: Status: RESOLVED | Noted: 2024-05-19 | Resolved: 2024-05-22

## 2024-05-22 LAB
ALBUMIN SERPL BCP-MCNC: 3.7 G/DL (ref 3.2–4.9)
BUN SERPL-MCNC: 7 MG/DL (ref 8–22)
CALCIUM ALBUM COR SERPL-MCNC: 9.1 MG/DL (ref 8.5–10.5)
CALCIUM SERPL-MCNC: 8.9 MG/DL (ref 8.4–10.2)
CHLORIDE SERPL-SCNC: 107 MMOL/L (ref 96–112)
CO2 SERPL-SCNC: 21 MMOL/L (ref 20–33)
CREAT SERPL-MCNC: 0.58 MG/DL (ref 0.5–1.4)
EKG IMPRESSION: NORMAL
ERYTHROCYTE [DISTWIDTH] IN BLOOD BY AUTOMATED COUNT: 40.5 FL (ref 35.9–50)
GFR SERPLBLD CREATININE-BSD FMLA CKD-EPI: 112 ML/MIN/1.73 M 2
GLUCOSE SERPL-MCNC: 83 MG/DL (ref 65–99)
HCT VFR BLD AUTO: 39.1 % (ref 37–47)
HGB BLD-MCNC: 13 G/DL (ref 12–16)
MAGNESIUM SERPL-MCNC: 2.1 MG/DL (ref 1.5–2.5)
MCH RBC QN AUTO: 31 PG (ref 27–33)
MCHC RBC AUTO-ENTMCNC: 33.2 G/DL (ref 32.2–35.5)
MCV RBC AUTO: 93.1 FL (ref 81.4–97.8)
PHOSPHATE SERPL-MCNC: 2.7 MG/DL (ref 2.5–4.5)
PLATELET # BLD AUTO: 236 K/UL (ref 164–446)
PMV BLD AUTO: 9.8 FL (ref 9–12.9)
POTASSIUM SERPL-SCNC: 3.6 MMOL/L (ref 3.6–5.5)
PROCALCITONIN SERPL-MCNC: <0.02 NG/ML
RBC # BLD AUTO: 4.2 M/UL (ref 4.2–5.4)
SODIUM SERPL-SCNC: 140 MMOL/L (ref 135–145)
WBC # BLD AUTO: 7.3 K/UL (ref 4.8–10.8)

## 2024-05-22 PROCEDURE — 99239 HOSP IP/OBS DSCHRG MGMT >30: CPT | Performed by: INTERNAL MEDICINE

## 2024-05-22 PROCEDURE — 93010 ELECTROCARDIOGRAM REPORT: CPT | Performed by: INTERNAL MEDICINE

## 2024-05-22 RX ORDER — CEPHALEXIN 500 MG/1
500 CAPSULE ORAL EVERY 6 HOURS
Status: DISCONTINUED | OUTPATIENT
Start: 2024-05-22 | End: 2024-05-22 | Stop reason: HOSPADM

## 2024-05-22 RX ORDER — CEPHALEXIN 500 MG/1
500 CAPSULE ORAL EVERY 6 HOURS
Qty: 12 CAPSULE | Refills: 0 | Status: ACTIVE | OUTPATIENT
Start: 2024-05-22 | End: 2024-05-25

## 2024-05-22 RX ORDER — ONDANSETRON 4 MG/1
8 TABLET, ORALLY DISINTEGRATING ORAL EVERY 8 HOURS PRN
Qty: 30 TABLET | Refills: 0 | Status: SHIPPED | OUTPATIENT
Start: 2024-05-22 | End: 2024-06-01

## 2024-05-22 RX ADMIN — ENOXAPARIN SODIUM 40 MG: 100 INJECTION SUBCUTANEOUS at 17:04

## 2024-05-22 RX ADMIN — CEFAZOLIN 2 G: 2 INJECTION, POWDER, FOR SOLUTION INTRAMUSCULAR; INTRAVENOUS at 13:28

## 2024-05-22 RX ADMIN — LORATADINE 10 MG: 10 TABLET ORAL at 13:13

## 2024-05-22 RX ADMIN — IOHEXOL 100 ML: 350 INJECTION, SOLUTION INTRAVENOUS at 16:03

## 2024-05-22 RX ADMIN — FAMOTIDINE 20 MG: 20 TABLET ORAL at 06:19

## 2024-05-22 RX ADMIN — SODIUM CHLORIDE: 9 INJECTION, SOLUTION INTRAVENOUS at 09:47

## 2024-05-22 RX ADMIN — CEFAZOLIN 2 G: 2 INJECTION, POWDER, FOR SOLUTION INTRAMUSCULAR; INTRAVENOUS at 06:20

## 2024-05-22 RX ADMIN — CEPHALEXIN 500 MG: 500 CAPSULE ORAL at 17:04

## 2024-05-22 RX ADMIN — ONDANSETRON 4 MG: 2 INJECTION INTRAMUSCULAR; INTRAVENOUS at 07:39

## 2024-05-22 ASSESSMENT — FIBROSIS 4 INDEX: FIB4 SCORE: 0.94

## 2024-05-22 ASSESSMENT — PAIN DESCRIPTION - PAIN TYPE: TYPE: ACUTE PAIN

## 2024-05-22 NOTE — ASSESSMENT & PLAN NOTE
Pt stated she would like to try a PCN allergy trial tomorrow, she would benefit with either Augmentin PO or Ancef on discharge.

## 2024-05-22 NOTE — PROGRESS NOTES
Telemetry Shift Summary    Rhythm SB / SR  HR Range 45-77  Ectopy R PVC  Measurements  0.16/0.08/0.40    Per monitor Shannan diaz    Normal Values  Rhythm SR  HR Range   Measurements 0.12-0.20 / 0.06-0.10 / 0.30-0.52

## 2024-05-22 NOTE — PROGRESS NOTES
Received report from Kassi SINGH.     EKG completed, MD and Charge RN notified.    Assessment completed. Bed is locked and in lowest position. Safety precautions in place. Reviewed POC. Call light within reach. No other needs at this time.

## 2024-05-22 NOTE — PROGRESS NOTES
Hospital Medicine Daily Progress Note    Date of Service  5/21/2024    Chief Complaint  Karis Gerardo is a 48 y.o. female admitted 5/19/2024 with   Chief Complaint   Patient presents with    Blood in Urine     Started this am       Painful Urination     Started this am      Sent from Urgent Care     States had blood work done at  this am  Sent to ED for abnormal WBC     Hospital Course  History of recent dog bite treated with doxycycline, history of recent travel to [place] presented with diarrhea, abdominal/flank pain, dysuria and vomiting.  She was found to have an abnormal UA and SIRS consistent with pyelonephritis.  She started empiric ceftriaxone therapy    Interval Problem Update  Pt reported vomiting this morning, unable to eat well yet. Could not take PO potassium.  - K 3.2, replacing via IV.  - pt requested famotidine and avoid PPI. I added home loratadine.  - pt  at bedside, I gave all updates.  - pt requested to hold of a CT A/P for tomorrow if she is not improving.  - Wbc 8.8, from 13.7 and 16.9. continue IV Ancef.    I have discussed this patient's plan of care and discharge plan at IDT rounds today with Case Management, Nursing, Nursing leadership, and other members of the IDT team.    Consultants/Specialty  none    Code Status  Full Code    Disposition  The patient is not medically cleared for discharge to home or a post-acute facility.      I have placed the appropriate orders for post-discharge needs.    Review of Systems  Review of Systems   Gastrointestinal:  Positive for nausea and vomiting.   Genitourinary:  Positive for flank pain.        Physical Exam  Temp:  [36.4 °C (97.6 °F)-37.2 °C (98.9 °F)] 36.4 °C (97.6 °F)  Pulse:  [65-95] 84  Resp:  [17-18] 18  BP: ()/(56-79) 108/65  SpO2:  [95 %-98 %] 96 %    Physical Exam  Vitals and nursing note reviewed.   Constitutional:       General: She is not in acute distress.     Appearance: Normal appearance. She is not ill-appearing.    HENT:      Head: Normocephalic and atraumatic.   Eyes:      General: No scleral icterus.     Extraocular Movements: Extraocular movements intact.   Cardiovascular:      Rate and Rhythm: Normal rate.      Pulses: Normal pulses.      Heart sounds: Normal heart sounds. No murmur heard.  Pulmonary:      Effort: Pulmonary effort is normal. No respiratory distress.      Breath sounds: Normal breath sounds.   Abdominal:      General: Abdomen is flat. Bowel sounds are normal. There is no distension.      Palpations: Abdomen is soft.      Tenderness: There is left CVA tenderness. There is no right CVA tenderness.   Musculoskeletal:         General: No swelling or tenderness. Normal range of motion.      Cervical back: Normal range of motion and neck supple.   Skin:     General: Skin is warm.      Capillary Refill: Capillary refill takes less than 2 seconds.      Coloration: Skin is not jaundiced.      Findings: No erythema.   Neurological:      General: No focal deficit present.      Mental Status: She is alert and oriented to person, place, and time. Mental status is at baseline.      Motor: No weakness.   Psychiatric:         Mood and Affect: Mood normal.         Behavior: Behavior normal.         Thought Content: Thought content normal.         Judgment: Judgment normal.         Fluids    Intake/Output Summary (Last 24 hours) at 5/21/2024 1751  Last data filed at 5/21/2024 1400  Gross per 24 hour   Intake 120 ml   Output 3800 ml   Net -3680 ml       Laboratory  Recent Labs     05/19/24  1530 05/20/24  0133 05/21/24  0158   WBC 16.6* 13.7* 8.8   RBC 4.84 4.28 4.14*   HEMOGLOBIN 14.9 13.3 12.9   HEMATOCRIT 44.7 39.5 39.2   MCV 92.4 92.3 94.7   MCH 30.8 31.1 31.2   MCHC 33.3 33.7 32.9   RDW 39.1 39.6 41.5   PLATELETCT 269 246 226   MPV 9.5 9.5 10.1     Recent Labs     05/19/24  1530 05/20/24  0133 05/21/24  0158   SODIUM 136 142 141   POTASSIUM 3.6 3.5* 3.2*   CHLORIDE 103 110 109   CO2 22 23 23   GLUCOSE 115* 107* 81    BUN 10 5* 5*   CREATININE 0.62 0.59 0.61   CALCIUM 9.1 8.6 8.6                   Imaging  US-ABDOMEN COMPLETE SURVEY   Final Result      Normal abdominal ultrasound. There is no evidence of gallstone or evidence of biliary ductal dilatation.           Assessment/Plan  * Acute pyelonephritis- (present on admission)  Assessment & Plan  Patient with acute pyelonephritis with left flank pain, fevers chills nausea vomiting and diarrhea    - Wbc 8.8, from 13.7 and 16.9. continue IV Ancef.  - pt requested to hold of a CT A/P for tomorrow if she is not improving.    Penicillin allergy- (present on admission)  Assessment & Plan  Pt stated she would like to try a PCN allergy trial tomorrow, she would benefit with either Augmentin PO or Ancef on discharge.    Nausea and vomiting- (present on admission)  Assessment & Plan  Pt reported chronic vomiting at home  - possible GERD  - started IV famotidine, po famotidine tomorrow. Pt does not want to use PPI due to side effects.    Hypokalemia  Assessment & Plan  - K 3.2, replacing via IV.    Diarrhea- (present on admission)  Assessment & Plan  Patient has diarrhea initially that started about 3 weeks ago when she was in [place].  Lasted about 5 days and then resolved.  Diarrhea again started up as the patient has been on doxycycline initially and then clindamycin and then nitrofurantoin    C difficile PCR pending    SIRS (systemic inflammatory response syndrome) (HCC)- (present on admission)  Assessment & Plan  SIRS criteria identified on my evaluation include:  Fever, with temperature greater than 100.9 deg F, Tachycardia, with heart rate greater than 90 BPM, and Leukocytosis, with WBC greater than 12,000  SIRS is non-infectious, the patient does not have sepsis  - Wbc 8.8, from 13.7 and 16.9. continue IV Ancef.    Leukocytosis- (present on admission)  Assessment & Plan  - Wbc 8.8, from 13.7 and 16.9. continue IV Ancef.         VTE prophylaxis:    enoxaparin ppx      I have  performed a physical exam and reviewed and updated ROS and Plan today (5/21/2024). In review of yesterday's note (5/20/2024), there are no changes except as documented above.      Total time spent 51 minutes. I spent greater than 50% of the time for patient care, including unit/floor time, multiple face-to-face encounters with the patient, counseling on treatment plan and discussion with bedside RN.  Further, I spent time on my own review of patient's overnight events, RN notes, imaging and lab analysis, and developing my assessment and plan above.  In addition, working with , social workers, and Charge RN on case coordination on this date.

## 2024-05-22 NOTE — ASSESSMENT & PLAN NOTE
Pt reported chronic vomiting at home  - possible GERD  - started IV famotidine, po famotidine tomorrow. Pt does not want to use PPI due to side effects.

## 2024-05-22 NOTE — DISCHARGE SUMMARY
Discharge Summary    CHIEF COMPLAINT ON ADMISSION  Chief Complaint   Patient presents with    Blood in Urine     Started this am       Painful Urination     Started this am      Sent from Urgent Care     States had blood work done at  this am  Sent to ED for abnormal WBC       Reason for Admission  Blood in Urine; Cough; Diarrhea; C*     Admission Date  5/19/2024    CODE STATUS  Full Code    HPI & HOSPITAL COURSE  This is a 48 y.o. female here with History of recent dog bite treated with doxycycline, history of recent travel to [place] presented with diarrhea, abdominal/flank pain, dysuria and vomiting.  She was found to have an abnormal UA and SIRS consistent with pyelonephritis.  She started empiric ceftriaxone therapy    Patient did present with positive SIRS (WBC 13.7, 16.9, fevers) but did not meet criteria for sepsis.  Patient was initially started on IV ceftriaxone and transition to IV Ancef to decrease her risk for C. difficile infection.  Patient was reporting she had diarrhea but she was negative for any C. difficile infection.  I did inform patient she can use Imodium if there needs to be control for diarrhea but I suspect this was due to multiple antibiotic uses from before.    We did obtain a CT scan, fortunately did not show any residual pyelonephritis.  This is concurrent with patient's improvement in her leukocytosis, now normalized.  I informed patient she will be on 3 more days of oral antibiotics with Keflex.  At this time patient's symptoms were matching pyelonephritis based on symptoms.  There was no CT scan obtained from admission.    Therefore, she is discharged in good and stable condition to home with close outpatient follow-up.    The patient met 2-midnight criteria for an inpatient stay at the time of discharge.    Discharge Date  05/22/2024    FOLLOW UP ITEMS POST DISCHARGE  With PCP    DISCHARGE DIAGNOSES  Principal Problem (Resolved):    Acute pyelonephritis (POA: Yes)  Active  Problems:    Penicillin allergy (Chronic) (POA: Yes)  Resolved Problems:    Leukocytosis (POA: Yes)    SIRS (systemic inflammatory response syndrome) (HCC) (POA: Yes)    Diarrhea (POA: Yes)    Hypokalemia (POA: Clinically Undetermined)    Nausea and vomiting (Chronic) (POA: Yes)      FOLLOW UP  Future Appointments   Date Time Provider Department Center   5/22/2024  6:00 PM [facility]   6/12/2024  8:00 AM Anthony Ward M.D. [facility] None     Delilah Lee M.D.  [address] [facility] [place] [address]  [phone]    Schedule an appointment as soon as possible for a visit in 1 week(s)  please call to make appointment to follow-up for your UTI.   Please extend antibiotics if needed      MEDICATIONS ON DISCHARGE     Medication List        START taking these medications        Instructions   cephALEXin 500 MG Caps  Commonly known as: Keflex   Take 1 Capsule by mouth every 6 hours for 3 days. Start at 11PM 5/22/24  Dose: 500 mg     ondansetron 4 MG Tbdp  Commonly known as: Zofran ODT   Take 2 Tablets by mouth every 8 hours as needed for Nausea/Vomiting (give PO if no IV route available) for up to 10 days.  Dose: 8 mg            CONTINUE taking these medications        Instructions   ALPHA LIPOIC ACID PO   Take 1 Capsule by mouth every day. (OTC)  Dose: 1 Capsule     Aspirin 81 MG Caps   Take 81 mg by mouth see administration instructions. Pt takes on Sun and Thur  Dose: 81 mg     azelastine 137 MCG/SPRAY nasal spray  Commonly known as: Astelin   Administer 1 Spray into affected nostril(S) 1 time a day as needed. Indications: Hayfever  Dose: 1 Spray     loratadine 10 MG Tabs  Commonly known as: Claritin   Take 10 mg by mouth 1 time a day as needed. Indications: Hayfever  Dose: 10 mg     multivitamin Tabs   Take 1 Tablet by mouth every day.  Dose: 1 Tablet     Retin-A 0.025 % cream  Generic drug: tretinoin   Apply 1 Application topically see administration instructions. Pt uses 3 times a week,  no set days  Dose: 1 Application            STOP taking these medications      clindamycin 150 MG Caps  Commonly known as: Cleocin     doxycycline 100 MG Tabs  Commonly known as: Vibramycin     ibuprofen 200 MG Tabs  Commonly known as: Motrin              Allergies  Allergies   Allergen Reactions    Pcn [Penicillins] Hives     RXN=as a child    Sulfa Drugs Hives     RXN=as a child       DIET  Orders Placed This Encounter   Procedures    Diet Order Diet: Regular     Standing Status:   Standing     Number of Occurrences:   1     Order Specific Question:   Diet:     Answer:   Regular [1]       ACTIVITY  As tolerated.  Weight bearing as tolerated    CONSULTATIONS  none    PROCEDURES  none    LABORATORY  Lab Results   Component Value Date    SODIUM 140 05/22/2024    POTASSIUM 3.6 05/22/2024    CHLORIDE 107 05/22/2024    CO2 21 05/22/2024    GLUCOSE 83 05/22/2024    BUN 7 (L) 05/22/2024    CREATININE 0.58 05/22/2024        Lab Results   Component Value Date    WBC 7.3 05/22/2024    HEMOGLOBIN 13.0 05/22/2024    HEMATOCRIT 39.1 05/22/2024    PLATELETCT 236 05/22/2024      CT-ABDOMEN-PELVIS WITH   Final Result      1.  No bowel obstruction or perforation.   2.  No CT evidence of pyelonephritis.   3.  Minimal pelvic fluid, possibly physiologic.   4.  No secondary signs of acute appendicitis, however the appendix is not visualized.   5.  Mild scoliosis.      US-ABDOMEN COMPLETE SURVEY   Final Result      Normal abdominal ultrasound. There is no evidence of gallstone or evidence of biliary ductal dilatation.          Total time of the discharge process exceeds 35 minutes.

## 2024-05-22 NOTE — CARE PLAN
The patient is Stable - Low risk of patient condition declining or worsening    Shift Goals  Clinical Goals: Monitor nausea, IV fluids, monitor pain  Patient Goals: go home  Family Goals: comfort    Progress made toward(s) clinical / shift goals:    Problem: Knowledge Deficit - Standard  Goal: Patient and family/care givers will demonstrate understanding of plan of care, disease process/condition, diagnostic tests and medications  Outcome: Progressing  Note: Reviewed POC; discussed CT, labs, IV abx, IV fluids, medications, and monitoring urine output.      Problem: Pain - Standard  Goal: Alleviation of pain or a reduction in pain to the patient’s comfort goal  Outcome: Progressing  Note: Discussed pharmacological and non-pharmacological pain management interventions.      Problem: Hemodynamics  Goal: Patient's hemodynamics, fluid balance and neurologic status will be stable or improve  Outcome: Progressing  Note: Patient afebrile, no changes in mentation or neurological status. Patient having adequate urine output and receiving IV fluids.        Patient is not progressing towards the following goals:

## 2024-05-22 NOTE — CARE PLAN
The patient is Stable - Low risk of patient condition declining or worsening    Shift Goals  Clinical Goals: monitor food intake & nausea  Patient Goals: go home  Family Goals: comfort    Progress made toward(s) clinical / shift goals:    Problem: Nutrition  Goal: Patient's nutritional and fluid intake will be adequate or improve  Outcome: Progressing  Flowsheets (Taken 5/21/2024 2130)  P.O.: 240 mL  Oral Nutrition:   Dinner   Between 25-50% Consumed  Note: Pt denies nausea or vomiting after PO intake. IV fluids administered per MAR       Patient is not progressing towards the following goals:

## 2024-05-22 NOTE — PROGRESS NOTES
Monitor tech notified this RN of polymorphic P waves on telemetry monitoring. Dr. Fontanez notified at 0707. ECG ordered.

## 2024-05-23 NOTE — PROGRESS NOTES
Patient discharged home. IV removed and tele box removed and returned to monitor tech. Patient states all questions and concerns have been addressed appropriately. Verbalized understanding of discharge instructions and to follow up with PCP and Nephrology. New prescriptions sent to [facility] [address] [place] [address] Ran .     Patient off the unit with spouse.

## 2024-05-23 NOTE — DISCHARGE INSTRUCTIONS
Urinary tract infection hygiene:  Please only use toilet paper to wipe front to back, do not reuse toilet paper.  Please avoid using wet wipes as this may allow continued fluid bridge from the anus to the vaginal area and urethra.  Please monitor for any incontinence. Change underwear, pads or diapers when wet as soon as possible.  Please consider washing with soap and water after having a bowel movement and ensure you are dry before placing any underwear or pants.  You and your partner may need to shower before any intercourse and shower afterwards to ensure cleanliness.  Please drink the appropriate amount of water as dehydration may also increase your risk for a urinary tract infection.  Please avoid any instrumentation to the area as this may add contamination

## 2024-05-24 LAB
BACTERIA BLD CULT: NORMAL
BACTERIA BLD CULT: NORMAL
SIGNIFICANT IND 70042: NORMAL
SIGNIFICANT IND 70042: NORMAL
SITE SITE: NORMAL
SITE SITE: NORMAL
SOURCE SOURCE: NORMAL
SOURCE SOURCE: NORMAL

## 2024-06-12 ENCOUNTER — APPOINTMENT (OUTPATIENT)
Dept: NEUROLOGY | Facility: MEDICAL CENTER | Age: 49
End: 2024-06-12
Attending: PSYCHIATRY & NEUROLOGY
Payer: COMMERCIAL

## 2024-06-29 ENCOUNTER — HOSPITAL ENCOUNTER (OUTPATIENT)
Facility: MEDICAL CENTER | Age: 49
End: 2024-06-29
Attending: INTERNAL MEDICINE
Payer: COMMERCIAL

## 2024-06-29 LAB
C DIFF DNA SPEC QL NAA+PROBE: NEGATIVE
C DIFF TOX A+B STL QL IA: NEGATIVE
C DIFF TOX GENS STL QL NAA+PROBE: NORMAL

## 2024-06-29 PROCEDURE — 87493 C DIFF AMPLIFIED PROBE: CPT

## 2024-06-29 PROCEDURE — 87324 CLOSTRIDIUM AG IA: CPT

## 2024-07-20 ENCOUNTER — HOSPITAL ENCOUNTER (OUTPATIENT)
Facility: MEDICAL CENTER | Age: 49
End: 2024-07-20
Attending: INTERNAL MEDICINE
Payer: COMMERCIAL

## 2024-07-20 LAB
C DIFF DNA SPEC QL NAA+PROBE: NEGATIVE
C DIFF TOX A+B STL QL IA: POSITIVE
C DIFF TOX GENS STL QL NAA+PROBE: NORMAL

## 2024-07-20 PROCEDURE — 87324 CLOSTRIDIUM AG IA: CPT

## 2024-07-20 PROCEDURE — 87493 C DIFF AMPLIFIED PROBE: CPT

## 2024-08-04 ENCOUNTER — HOSPITAL ENCOUNTER (EMERGENCY)
Facility: MEDICAL CENTER | Age: 49
End: 2024-08-04
Attending: EMERGENCY MEDICINE
Payer: COMMERCIAL

## 2024-08-04 VITALS
HEART RATE: 82 BPM | OXYGEN SATURATION: 99 % | TEMPERATURE: 96.9 F | BODY MASS INDEX: 17.85 KG/M2 | DIASTOLIC BLOOD PRESSURE: 67 MMHG | WEIGHT: 107.14 LBS | RESPIRATION RATE: 16 BRPM | SYSTOLIC BLOOD PRESSURE: 108 MMHG | HEIGHT: 65 IN

## 2024-08-04 DIAGNOSIS — A04.72 C. DIFFICILE COLITIS: ICD-10-CM

## 2024-08-04 DIAGNOSIS — A09 DIARRHEA OF INFECTIOUS ORIGIN: ICD-10-CM

## 2024-08-04 LAB
ALBUMIN SERPL BCP-MCNC: 4.7 G/DL (ref 3.2–4.9)
ALBUMIN/GLOB SERPL: 1.7 G/DL
ALP SERPL-CCNC: 75 U/L (ref 30–99)
ALT SERPL-CCNC: 15 U/L (ref 2–50)
ANION GAP SERPL CALC-SCNC: 12 MMOL/L (ref 7–16)
APPEARANCE UR: CLEAR
AST SERPL-CCNC: 17 U/L (ref 12–45)
BACTERIA #/AREA URNS HPF: ABNORMAL /HPF
BASOPHILS # BLD AUTO: 0.5 % (ref 0–1.8)
BASOPHILS # BLD: 0.05 K/UL (ref 0–0.12)
BILIRUB SERPL-MCNC: 0.7 MG/DL (ref 0.1–1.5)
BILIRUB UR QL STRIP.AUTO: NEGATIVE
BUN SERPL-MCNC: 10 MG/DL (ref 8–22)
C DIFF DNA SPEC QL NAA+PROBE: NEGATIVE
C DIFF TOX GENS STL QL NAA+PROBE: NEGATIVE
CALCIUM ALBUM COR SERPL-MCNC: 9.3 MG/DL (ref 8.5–10.5)
CALCIUM SERPL-MCNC: 9.9 MG/DL (ref 8.4–10.2)
CHLORIDE SERPL-SCNC: 99 MMOL/L (ref 96–112)
CO2 SERPL-SCNC: 28 MMOL/L (ref 20–33)
COLOR UR: YELLOW
CREAT SERPL-MCNC: 0.71 MG/DL (ref 0.5–1.4)
EKG IMPRESSION: NORMAL
EOSINOPHIL # BLD AUTO: 0.09 K/UL (ref 0–0.51)
EOSINOPHIL NFR BLD: 0.9 % (ref 0–6.9)
EPI CELLS #/AREA URNS HPF: ABNORMAL /HPF
ERYTHROCYTE [DISTWIDTH] IN BLOOD BY AUTOMATED COUNT: 39.3 FL (ref 35.9–50)
GFR SERPLBLD CREATININE-BSD FMLA CKD-EPI: 105 ML/MIN/1.73 M 2
GLOBULIN SER CALC-MCNC: 2.8 G/DL (ref 1.9–3.5)
GLUCOSE SERPL-MCNC: 97 MG/DL (ref 65–99)
GLUCOSE UR STRIP.AUTO-MCNC: NEGATIVE MG/DL
HCT VFR BLD AUTO: 46.6 % (ref 37–47)
HGB BLD-MCNC: 15.8 G/DL (ref 12–16)
IMM GRANULOCYTES # BLD AUTO: 0.03 K/UL (ref 0–0.11)
IMM GRANULOCYTES NFR BLD AUTO: 0.3 % (ref 0–0.9)
KETONES UR STRIP.AUTO-MCNC: NEGATIVE MG/DL
LEUKOCYTE ESTERASE UR QL STRIP.AUTO: NEGATIVE
LIPASE SERPL-CCNC: 68 U/L (ref 11–82)
LYMPHOCYTES # BLD AUTO: 0.92 K/UL (ref 1–4.8)
LYMPHOCYTES NFR BLD: 9.6 % (ref 22–41)
MCH RBC QN AUTO: 31.2 PG (ref 27–33)
MCHC RBC AUTO-ENTMCNC: 33.9 G/DL (ref 32.2–35.5)
MCV RBC AUTO: 91.9 FL (ref 81.4–97.8)
MICRO URNS: ABNORMAL
MONOCYTES # BLD AUTO: 0.62 K/UL (ref 0–0.85)
MONOCYTES NFR BLD AUTO: 6.4 % (ref 0–13.4)
NEUTROPHILS # BLD AUTO: 7.91 K/UL (ref 1.82–7.42)
NEUTROPHILS NFR BLD: 82.3 % (ref 44–72)
NITRITE UR QL STRIP.AUTO: NEGATIVE
NRBC # BLD AUTO: 0 K/UL
NRBC BLD-RTO: 0 /100 WBC (ref 0–0.2)
PH UR STRIP.AUTO: 7.5 [PH] (ref 5–8)
PLATELET # BLD AUTO: 269 K/UL (ref 164–446)
PMV BLD AUTO: 9.3 FL (ref 9–12.9)
POTASSIUM SERPL-SCNC: 3.9 MMOL/L (ref 3.6–5.5)
PROT SERPL-MCNC: 7.5 G/DL (ref 6–8.2)
PROT UR QL STRIP: NEGATIVE MG/DL
RBC # BLD AUTO: 5.07 M/UL (ref 4.2–5.4)
RBC # URNS HPF: ABNORMAL /HPF
RBC UR QL AUTO: ABNORMAL
SODIUM SERPL-SCNC: 139 MMOL/L (ref 135–145)
SP GR UR STRIP.AUTO: <=1.005
WBC # BLD AUTO: 9.6 K/UL (ref 4.8–10.8)
WBC #/AREA URNS HPF: ABNORMAL /HPF

## 2024-08-04 PROCEDURE — 99285 EMERGENCY DEPT VISIT HI MDM: CPT

## 2024-08-04 PROCEDURE — 96374 THER/PROPH/DIAG INJ IV PUSH: CPT

## 2024-08-04 PROCEDURE — 81001 URINALYSIS AUTO W/SCOPE: CPT

## 2024-08-04 PROCEDURE — 80053 COMPREHEN METABOLIC PANEL: CPT

## 2024-08-04 PROCEDURE — 85025 COMPLETE CBC W/AUTO DIFF WBC: CPT

## 2024-08-04 PROCEDURE — 93005 ELECTROCARDIOGRAM TRACING: CPT

## 2024-08-04 PROCEDURE — 700111 HCHG RX REV CODE 636 W/ 250 OVERRIDE (IP): Mod: JZ | Performed by: EMERGENCY MEDICINE

## 2024-08-04 PROCEDURE — 700105 HCHG RX REV CODE 258: Performed by: EMERGENCY MEDICINE

## 2024-08-04 PROCEDURE — 83690 ASSAY OF LIPASE: CPT

## 2024-08-04 PROCEDURE — 36415 COLL VENOUS BLD VENIPUNCTURE: CPT

## 2024-08-04 PROCEDURE — 93005 ELECTROCARDIOGRAM TRACING: CPT | Performed by: EMERGENCY MEDICINE

## 2024-08-04 PROCEDURE — 87493 C DIFF AMPLIFIED PROBE: CPT

## 2024-08-04 RX ORDER — SODIUM CHLORIDE 9 MG/ML
1000 INJECTION, SOLUTION INTRAVENOUS ONCE
Status: COMPLETED | OUTPATIENT
Start: 2024-08-04 | End: 2024-08-04

## 2024-08-04 RX ORDER — FIDAXOMICIN 200 MG/1
TABLET, FILM COATED ORAL
Qty: 20 TABLET | Refills: 0 | Status: ACTIVE | OUTPATIENT
Start: 2024-08-04 | End: 2024-08-29

## 2024-08-04 RX ORDER — ONDANSETRON 2 MG/ML
4 INJECTION INTRAMUSCULAR; INTRAVENOUS ONCE
Status: COMPLETED | OUTPATIENT
Start: 2024-08-04 | End: 2024-08-04

## 2024-08-04 RX ORDER — FIDAXOMICIN 200 MG/1
200 TABLET, FILM COATED ORAL 2 TIMES DAILY
Status: SHIPPED | COMMUNITY
End: 2024-08-04

## 2024-08-04 RX ADMIN — SODIUM CHLORIDE 1000 ML: 9 INJECTION, SOLUTION INTRAVENOUS at 11:47

## 2024-08-04 RX ADMIN — ONDANSETRON 4 MG: 2 INJECTION INTRAMUSCULAR; INTRAVENOUS at 11:48

## 2024-08-04 ASSESSMENT — FIBROSIS 4 INDEX: FIB4 SCORE: 0.96

## 2024-08-04 NOTE — ED NOTES
Medication history reviewed with pt. Med rec is complete.  Allergies reviewed, per pt  Interviewed pt with  at bedside with permission from pt.    Pt reports that she has been off XARELTO 10MG since 2022    Patient has had outpatient antibiotics in the last 30 days, pt started DIFICID 200MG on 7/21/2024 for 10 day course.  Per pt reports that she did finish course of antibiotic.     Pt is not on any anticoagulants

## 2024-08-04 NOTE — ED PROVIDER NOTES
ED Provider Note    CHIEF COMPLAINT  Chief Complaint   Patient presents with    N/V    Diarrhea     Reports recent c-diff tx. States diarrhea continues with N/V, low abd tenderness. Denies fevers.     Rapid Heart Beat     140s this AM per pt. 97 on triage. Denies CP or SOB.        EXTERNAL RECORDS REVIEWED  Discharge summary 5/22/2024, pyelonephritis,    HPI/ROS    Karis Gerardo is a 49 y.o. female who presents for recurrence of mucousy diarrhea.  She states that she has been treated twice for C. difficile colitis recently, symptoms improved until today.  She reports this all dates back to a dog bite several months ago, had 8 antibiotics within the course of several weeks, ultimately a diagnosis C. difficile colitis.  Treated twice most recently finished a course about 4 or 5 days ago.  She has had some vomiting but states she seems to vomit often.  She does have some lower abdominal discomfort.  No fever.  No burning or blood with urination.  Denies possibility of pregnancy.  No other specific complaints.  She called her PCP, they were going to order a C. difficile test but she instead was sent here for this test.    PAST MEDICAL HISTORY   has a past medical history of Clostridium difficile diarrhea and Patient denies medical problems.    SURGICAL HISTORY   has a past surgical history that includes nasal reconstruction.    FAMILY HISTORY  Family History   Problem Relation Age of Onset    Hypertension Father     Hyperlipidemia Sister     Cancer Paternal Aunt         breast       SOCIAL HISTORY  Social History     Tobacco Use    Smoking status: Never    Smokeless tobacco: Never   Vaping Use    Vaping status: Never Used   Substance and Sexual Activity    Alcohol use: Yes    Drug use: Not Currently     Comment: denies    Sexual activity: Not on file       CURRENT MEDICATIONS  Home Medications       Reviewed by Preethi Loya R.N. (Registered Nurse) on 08/04/24 at 1106  Med List Status: Not Addressed  "    Medication Last Dose Status   ALPHA LIPOIC ACID PO  Active   Aspirin 81 MG Cap  Active   azelastine (ASTELIN) 137 MCG/SPRAY nasal spray  Active   cholestyramine (QUESTRAN) 4 GM Pack  Active   dexlansoprazole (DEXILANT) 30 MG CAPSULE DELAYED RELEASE  Active   dicyclomine (BENTYL) 10 MG Cap  Active   IMVEXXY MAINTENANCE PACK 4 MCG INSERT  Active   loratadine (CLARITIN) 10 MG Tab  Active   multivitamin Tab  Active   ondansetron (ZOFRAN ODT) 8 MG TABLET DISPERSIBLE  Active   PARoxetine (PAXIL) 10 MG Tab  Active   polyethylene glycol-electrolytes (GAVILYTE-G) 236 GM Recon Soln  Active   predniSONE (DELTASONE) 10 MG Tab  Active   rivaroxaban (XARELTO) 10 MG Tab tablet  Active   sertraline (ZOLOFT) 25 MG tablet  Active   tretinoin (RETIN-A) 0.025 % cream  Active   triamcinolone (ARISTOCORT) 0.5 % ointment  Active                    ALLERGIES  Allergies   Allergen Reactions    Pcn [Penicillins] Hives     RXN=as a child    Sulfa Drugs Hives     RXN=as a child       PHYSICAL EXAM  VITAL SIGNS: /70   Pulse 75   Temp 36.6 °C (97.8 °F) (Temporal)   Resp 18   Ht 1.651 m (5' 5\")   Wt 48.6 kg (107 lb 2.3 oz)   LMP 05/15/2024 (Approximate)   SpO2 98%   BMI 17.83 kg/m²    Constitutional: Well appearing patient in no acute distress.  Awake and alert, not toxic nor ill in appearance.  HENT: Normocephalic, no obvious evidence of acute trauma.  Eyes: No scleral icterus. Normal conjunctiva   Thorax & Lungs: Normal nonlabored respirations. I appreciate no wheezing, rhonchi or rales. There is normal air movement.  Upon cardiac ascultation I appreciate a regular heart rhythm and a normal rate.   Abdomen: The abdomen is not visibly distended.  Upon palpation she has mild lower abdominal tenderness with no rebound and no guarding  Skin: The exposed portions of skin reveal no obvious rash or other abnormalities.    EKG/LABS  Results for orders placed or performed during the hospital encounter of 08/04/24   CBC WITH " DIFFERENTIAL   Result Value Ref Range    WBC 9.6 4.8 - 10.8 K/uL    RBC 5.07 4.20 - 5.40 M/uL    Hemoglobin 15.8 12.0 - 16.0 g/dL    Hematocrit 46.6 37.0 - 47.0 %    MCV 91.9 81.4 - 97.8 fL    MCH 31.2 27.0 - 33.0 pg    MCHC 33.9 32.2 - 35.5 g/dL    RDW 39.3 35.9 - 50.0 fL    Platelet Count 269 164 - 446 K/uL    MPV 9.3 9.0 - 12.9 fL    Neutrophils-Polys 82.30 (H) 44.00 - 72.00 %    Lymphocytes 9.60 (L) 22.00 - 41.00 %    Monocytes 6.40 0.00 - 13.40 %    Eosinophils 0.90 0.00 - 6.90 %    Basophils 0.50 0.00 - 1.80 %    Immature Granulocytes 0.30 0.00 - 0.90 %    Nucleated RBC 0.00 0.00 - 0.20 /100 WBC    Neutrophils (Absolute) 7.91 (H) 1.82 - 7.42 K/uL    Lymphs (Absolute) 0.92 (L) 1.00 - 4.80 K/uL    Monos (Absolute) 0.62 0.00 - 0.85 K/uL    Eos (Absolute) 0.09 0.00 - 0.51 K/uL    Baso (Absolute) 0.05 0.00 - 0.12 K/uL    Immature Granulocytes (abs) 0.03 0.00 - 0.11 K/uL    NRBC (Absolute) 0.00 K/uL   COMP METABOLIC PANEL   Result Value Ref Range    Sodium 139 135 - 145 mmol/L    Potassium 3.9 3.6 - 5.5 mmol/L    Chloride 99 96 - 112 mmol/L    Co2 28 20 - 33 mmol/L    Anion Gap 12.0 7.0 - 16.0    Glucose 97 65 - 99 mg/dL    Bun 10 8 - 22 mg/dL    Creatinine 0.71 0.50 - 1.40 mg/dL    Calcium 9.9 8.4 - 10.2 mg/dL    Correct Calcium 9.3 8.5 - 10.5 mg/dL    AST(SGOT) 17 12 - 45 U/L    ALT(SGPT) 15 2 - 50 U/L    Alkaline Phosphatase 75 30 - 99 U/L    Total Bilirubin 0.7 0.1 - 1.5 mg/dL    Albumin 4.7 3.2 - 4.9 g/dL    Total Protein 7.5 6.0 - 8.2 g/dL    Globulin 2.8 1.9 - 3.5 g/dL    A-G Ratio 1.7 g/dL   LIPASE   Result Value Ref Range    Lipase 68 11 - 82 U/L   URINALYSIS (UA)    Specimen: Urine   Result Value Ref Range    Color Yellow     Character Clear     Specific Gravity <=1.005 <1.035    Ph 7.5 5.0 - 8.0    Glucose Negative Negative mg/dL    Ketones Negative Negative mg/dL    Protein Negative Negative mg/dL    Bilirubin Negative Negative    Nitrite Negative Negative    Leukocyte Esterase Negative Negative     Occult Blood Trace (A) Negative    Micro Urine Req Microscopic    ESTIMATED GFR   Result Value Ref Range    GFR (CKD-EPI) 105 >60 mL/min/1.73 m 2   URINE MICROSCOPIC (W/UA)   Result Value Ref Range    WBC 0-2 /hpf    RBC 0-2 /hpf    Bacteria Few (A) None /hpf    Epithelial Cells Rare Few /hpf   EKG   Result Value Ref Range    Report       [facility] Emergency Dept.    Test Date:  2024  Pt Name:    AURE SUAREZ            Department: [facility]  MRN:        [id number]                      Room:  Gender:     Female                       Technician: [id number]  :        [id number]                   Requested By:ER TRIAGE PROTOCOL  Order #:    [id number]                    Reading MD: HARDEEP STRANGE MD    Measurements  Intervals                                Axis  Rate:       94                           P:          89  CA:         153                          QRS:        26  QRSD:       86                           T:          60  QT:         346  QTc:        433    Interpretive Statements  12 Lead EKG interpreted by me to show: -- Rate 94 -- Rhythm: Normal sinus  rhythm --  CA and QRS Intervals: Normal -- T waves: No acute changes -- ST  segments: No acute changes -- Ectopy: None. My impression of this EKG: Does  not indicate acute ischemia at this time.  Electronically Signed On 2024 11: 33:12 PDT by HARDEEP STRANGE MD        I have independently interpreted this EKG      COURSE & MEDICAL DECISION MAKING    ASSESSMENT, COURSE AND PLAN  Care Narrative: 49-year-old female with a return of diarrhea with multiple bouts of C. difficile recently, lower abdominal pain as well.  She has had some vomiting but states it is not exactly acute.  Appears generally well on exam with normal vital signs, no peritonitis on abdominal palpation.  Blood work will be obtained.  IV fluid to be infused, will retest for C. difficile.    Blood work reveals no significant leukocytosis or anemia.  Normal  electrolytes, GFR and LFTs and normal lipase.  Urinalysis is not indicative of infection.  C. difficile PCR is still pending, given her recent treatments for C. difficile I did consult our infectious disease physician Dr. Ogden, recommends a 5-day course of Dificid, 200 mg twice daily.  This will be followed by a 20-day course of Dificid at 200 mg every 48 hours.  This of course is pending results of the C. difficile, he recommends discontinuation if negative.  The patient will follow-up as scheduled with her gastroenterologist, they have already discussed fecal transplant.  She is discharged home in stable condition with strict return instructions provide  Prescription for Dificid          DISPOSITION AND DISCUSSIONS  I have discussed management of the patient with the following physicians and NORBERTO's:  Dr. Ogden, infectious disease    FINAL DIAGNOSIS  1. Diarrhea of infectious origin    2. C. difficile colitis         Electronically signed by: Aurelio Herrera M.D., 8/4/2024 12:52 PM

## 2024-08-04 NOTE — ED NOTES
PIV removed - tip intact.  Discharge instructions given to pt with verbalized understanding.  Pt ambulatory out of the ER with her .

## 2024-08-17 ENCOUNTER — HOSPITAL ENCOUNTER (OUTPATIENT)
Facility: MEDICAL CENTER | Age: 49
End: 2024-08-17
Attending: STUDENT IN AN ORGANIZED HEALTH CARE EDUCATION/TRAINING PROGRAM
Payer: COMMERCIAL

## 2024-08-17 LAB
C DIFF DNA SPEC QL NAA+PROBE: NEGATIVE
C DIFF TOX GENS STL QL NAA+PROBE: NEGATIVE

## 2024-08-17 PROCEDURE — 87493 C DIFF AMPLIFIED PROBE: CPT

## 2024-08-24 ENCOUNTER — HOSPITAL ENCOUNTER (OUTPATIENT)
Facility: MEDICAL CENTER | Age: 49
End: 2024-08-24
Attending: STUDENT IN AN ORGANIZED HEALTH CARE EDUCATION/TRAINING PROGRAM
Payer: COMMERCIAL

## 2024-09-06 ENCOUNTER — HOSPITAL ENCOUNTER (OUTPATIENT)
Facility: MEDICAL CENTER | Age: 49
End: 2024-09-06
Attending: STUDENT IN AN ORGANIZED HEALTH CARE EDUCATION/TRAINING PROGRAM
Payer: COMMERCIAL

## 2024-09-06 LAB
C DIFF DNA SPEC QL NAA+PROBE: NEGATIVE
C DIFF TOX GENS STL QL NAA+PROBE: NEGATIVE

## 2024-09-06 PROCEDURE — 87493 C DIFF AMPLIFIED PROBE: CPT

## 2024-11-02 ENCOUNTER — HOSPITAL ENCOUNTER (OUTPATIENT)
Dept: RADIOLOGY | Facility: MEDICAL CENTER | Age: 49
End: 2024-11-02
Attending: INTERNAL MEDICINE
Payer: COMMERCIAL

## 2024-11-02 DIAGNOSIS — R42 DIZZINESS AND GIDDINESS: ICD-10-CM

## 2024-11-02 DIAGNOSIS — R10.9 STOMACH ACHE: ICD-10-CM

## 2024-11-02 DIAGNOSIS — R19.7 DIARRHEA OF PRESUMED INFECTIOUS ORIGIN: ICD-10-CM

## 2024-11-02 DIAGNOSIS — R11.0 NAUSEA: ICD-10-CM

## 2024-11-02 PROCEDURE — A9579 GAD-BASE MR CONTRAST NOS,1ML: HCPCS | Mod: JZ | Performed by: INTERNAL MEDICINE

## 2024-11-02 PROCEDURE — 700117 HCHG RX CONTRAST REV CODE 255: Mod: JZ | Performed by: INTERNAL MEDICINE

## 2024-11-02 PROCEDURE — 74183 MRI ABD W/O CNTR FLWD CNTR: CPT

## 2024-11-02 PROCEDURE — 72197 MRI PELVIS W/O & W/DYE: CPT

## 2024-11-02 PROCEDURE — 700111 HCHG RX REV CODE 636 W/ 250 OVERRIDE (IP): Mod: JZ | Performed by: RADIOLOGY

## 2024-11-02 RX ADMIN — GLUCAGON 1 MG: 1 INJECTION, POWDER, LYOPHILIZED, FOR SOLUTION INTRAMUSCULAR; INTRAVENOUS at 17:41

## 2024-11-02 RX ADMIN — GADOTERIDOL 10 ML: 279.3 INJECTION, SOLUTION INTRAVENOUS at 17:36

## 2024-11-25 NOTE — PROGRESS NOTES
NEW DOAC   .  Anticoagulation Summary  As of 10/6/2022      INR goal:     TTR:  --   INR used for dosing:     Warfarin maintenance plan:  No maintenance plan   Next INR check:  10/18/2022   Target end date:  12/28/2022    Indications    Deep vein thrombosis (HCC) [I82.409]                 Anticoagulation Episode Summary       INR check location:      Preferred lab:      Send INR reminders to:      Comments:            Anticoagulation Care Providers       Provider Role Specialty Phone number    [facility] Responsible  [phone]          Anticoagulation Patient Findings      PCP: Enrique GRULLON M.D.  Cardiologist: none  Dx: Recent VTE of LLE  CHADSVASC = n/a  HAS-BLED = 0  Target End Date = TBD,  likely 3 months the reassess.     Pt presented to ER on 9/27 fo s/s of VTE.  Ultrasound was positive and was started on Xarelto.       -No significant family hx of VTE. No previous VTE events.   -Pt had Covid 2 months prior to her VTE  -She was on chronic oral contraceptive treatment prior to VTE, it has subsequently been discontinued.   -No recent trauma or provoking factors other than what's listed above.   -States Dr Haro and Dr Salcedo saw her at [facility] and want her to be on Eliquis only - not Xarelto.  She completed Eliquis 10 mg BID and is now on Eliquis 5 mg BID.  She has run into issues with her insurance covering the Eliquis.         Labs:  Lab Results   Component Value Date/Time    WBC 9.4 09/27/2022 08:15 PM    RBC 4.84 09/27/2022 08:15 PM    HEMOGLOBIN 15.0 09/27/2022 08:15 PM    HEMATOCRIT 44.9 09/27/2022 08:15 PM    MCV 92.8 09/27/2022 08:15 PM    MCH 31.0 09/27/2022 08:15 PM    MCHC 33.4 (L) 09/27/2022 08:15 PM    MPV 9.1 09/27/2022 08:15 PM    NEUTSPOLYS 66.70 09/27/2022 08:15 PM    LYMPHOCYTES 21.10 (L) 09/27/2022 08:15 PM    MONOCYTES 6.80 09/27/2022 08:15 PM    EOSINOPHILS 4.70 09/27/2022 08:15 PM    BASOPHILS 0.50 09/27/2022 08:15 PM      Lab Results   Component  Value Date/Time    SODIUM 137 09/27/2022 08:15 PM    POTASSIUM 4.2 09/27/2022 08:15 PM    CHLORIDE 103 09/27/2022 08:15 PM    CO2 23 09/27/2022 08:15 PM    GLUCOSE 93 09/27/2022 08:15 PM    BUN 12 09/27/2022 08:15 PM    CREATININE 0.67 09/27/2022 08:15 PM          Pt is new to Eliquis and new to [facility]. Discussed:   Indication for DOAC therapy.  Importance of monitoring and compliance.   Monitoring parameters, signs and symptoms of bleeding or clotting.  DOAC therapy, side effects, potential DDIs, OTC medications  Hormonal therapy   Pregnancy (Counseled pt)  DDI none  Pt is not on antiplatelet/NSAID therapy.   Lifestyle safety, ie smoking, ETOH, hobby safety, fall safety/prevention  Procedures for missed doses or suspected missed doses, surgeries/procedures, travel, dental work, any medication changes    Continue Eliquis 5mg taken 2 times daily.  Has Eliquis 5 mg samples (from another facility) sufficient until next appt on 10/18/22    DOAC affordable = TBD    Samples provided: none    Labs to be completed prior to next f/u - none    F/U - 1.5 weeks before samples run out.     Bienvenido Rodriguez, PharmD    CC  Dr Bloch Dr Pasternak [phone]       None

## 2024-12-30 ENCOUNTER — APPOINTMENT (OUTPATIENT)
Dept: NEUROLOGY | Facility: MEDICAL CENTER | Age: 49
End: 2024-12-30
Attending: PSYCHIATRY & NEUROLOGY
Payer: COMMERCIAL

## 2025-06-09 ENCOUNTER — OFFICE VISIT (OUTPATIENT)
Dept: NEUROLOGY | Facility: MEDICAL CENTER | Age: 50
End: 2025-06-09
Attending: PSYCHIATRY & NEUROLOGY
Payer: COMMERCIAL

## 2025-06-09 VITALS
HEART RATE: 84 BPM | DIASTOLIC BLOOD PRESSURE: 64 MMHG | SYSTOLIC BLOOD PRESSURE: 118 MMHG | WEIGHT: 111.55 LBS | OXYGEN SATURATION: 100 % | TEMPERATURE: 97.9 F | HEIGHT: 65 IN | BODY MASS INDEX: 18.59 KG/M2

## 2025-06-09 DIAGNOSIS — R20.2 PARESTHESIAS: Primary | ICD-10-CM

## 2025-06-09 PROCEDURE — 99213 OFFICE O/P EST LOW 20 MIN: CPT | Performed by: PSYCHIATRY & NEUROLOGY

## 2025-06-09 PROCEDURE — 3074F SYST BP LT 130 MM HG: CPT | Performed by: PSYCHIATRY & NEUROLOGY

## 2025-06-09 PROCEDURE — 3078F DIAST BP <80 MM HG: CPT | Performed by: PSYCHIATRY & NEUROLOGY

## 2025-06-09 PROCEDURE — 99215 OFFICE O/P EST HI 40 MIN: CPT | Performed by: PSYCHIATRY & NEUROLOGY

## 2025-06-09 ASSESSMENT — PATIENT HEALTH QUESTIONNAIRE - PHQ9
5. POOR APPETITE OR OVEREATING: 3 - NEARLY EVERY DAY
SUM OF ALL RESPONSES TO PHQ QUESTIONS 1-9: 13
CLINICAL INTERPRETATION OF PHQ2 SCORE: 2

## 2025-06-09 ASSESSMENT — ENCOUNTER SYMPTOMS
NAUSEA: 1
TINGLING: 1
DEPRESSION: 0
FALLS: 0
MEMORY LOSS: 0
LOSS OF CONSCIOUSNESS: 0
HEADACHES: 0
VOMITING: 1
NECK PAIN: 0

## 2025-06-09 ASSESSMENT — FIBROSIS 4 INDEX: FIB4 SCORE: 0.77

## 2025-06-09 NOTE — PROGRESS NOTES
Subjective     Karis Gerardo is a 49 y.o. female who presents with her  Franky, for follow-up, last seen in March, 2024, for history of persistent left-sided paresthesias and which have continued though have changed slightly in terms of severity.     HPI    When last seen, Karis had been complaining of paresthesias involving the face, left upper and lower extremities in their entirety.  The symptoms would fluctuate, there were brief intervals where they would resolve.  The facial symptoms were more persistent and distracting for her.  She never had motor, coordination, visual or bowel and bladder control issues.  Her examination at that time was remarkable only for brisk reflexes, there is seem to be a slight left-sided predominance, nothing pathologic.  There was no meaningful sensory loss.    MRI scan of the brain with and without contrast was done in June, 2024, revealing only minimal chronic sclerotic changes in the cerebral hemispheres, nothing else of note except for some left-sided sinus inflammation.  She underwent treatment for sinusitis, this helped the facial symptoms attenuate though they have continued to recur but only briefly and in mild severity.    Since then she has noted that the paresthesias on the left seem to be more involving the hand.  She cannot be specific as to the location in terms of which fingers involved.  There is no positional quality, the symptoms fluctuated independent of activity, there is no diurnal pattern with the symptoms worsening at nighttime.  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  There is now constant tingling.    She denies neck pain with radiation down the spinal axis or into the upper extremities, no change in bowel or bladder control, symptoms are not affected by Valsalva.  She continues to deny headache, slurred speech, facial weakness, hiccups, or visual  "changes with diplopia and unilateral loss.    She has had some persistent nausea and vomiting which has yet to be determined as to cause.  She has a history of migraines when she was much younger, Mostly menstrual, now has more of the acephalic migrainous episodes.    Medical, surgical and family histories are reviewed, there are no new drug allergies.  She denies any atypical stressors.  She is on Retin-A cream.    Review of Systems   Constitutional:  Negative for malaise/fatigue.   Gastrointestinal:  Positive for nausea and vomiting.   Musculoskeletal:  Negative for falls and neck pain.   Neurological:  Positive for tingling. Negative for loss of consciousness and headaches.   Psychiatric/Behavioral:  Negative for depression, memory loss and suicidal ideas.    All other systems reviewed and are negative.    Objective     /64 (BP Location: Left arm, Patient Position: Sitting, BP Cuff Size: Adult)   Pulse 84   Temp 36.6 °C (97.9 °F) (Temporal)   Ht 1.651 m (5' 5\")   Wt 50.6 kg (111 lb 8.8 oz)   SpO2 100%   BMI 18.56 kg/m²      Physical Exam    She appears in no acute distress.  Vital signs are stable. There is no malar rash, jaw or temporal tenderness, there is no jaw claudication.  The neck is supple, range of motion is full.  Compression maneuvers are negative, Lhermitte phenomena is absent.  Carotid pulses are present bilaterally without asymmetry.  There is no tenderness at the elbows bilaterally, there is mild tenderness of the left wrist to palpation, but Tinel and Phalen signs are absent bilaterally.  Straight leg raising is negative bilaterally.  There is no tenderness behind the medial malleolus of the left ankle.     Neurological Exam    Fully oriented, there is no aphasia, agnosia, apraxia, or inattention.    PERRLA/EOMI, visual fields are full, facial movements are symmetric, sensory exam is intact to temperature, pinprick and light touch.  She felt vibration equally on both sides of the jaw, " she did not split sensory modalities across the midline.  The tongue and uvula are midline without bulbar dysfunction.  Jaw movements are intact, jaw jerk is absent.  Shoulder shrug and head rotation are symmetric.    Musculoskeletal exam reveals normal tone throughout, there is no tremor, asterixis, or drift.  Strength is 5/5 throughout.  Attention was paid to the intrinsic muscles of both hands.    Reflexes remain very brisk bilaterally, there was even some spread including crossed adductors, but everything was symmetric.  The toes are downgoing.  There is 1 beat of ankle clonus bilaterally.    She stands easily, gait is normal and station and stride length, armswing is symmetric.  Heel, toe, and tandem walking can all be done with these.  There is no appendicular dystaxia.  Repetitive movements are symmetric, amplitude and frequencies normal.  There is no loss of dexterity with the fingers.    Sensory exam again is intact to temperature, pinprick, light touch and vibration.  She feels vibration equally on both sides of the sternum, she does not split the midline with any of the sensory modalities assessed.  There is no sensory level to pinprick.  Assessment & Plan  Paresthesias  Fortunately, her examination today is benign, the symptoms themselves have become more localized, the left upper extremity is now mostly the left hand, left lower extremity now mostly in the foot medially.  It is difficult to know what benign paresthesias might represent if anything pathologic.  They become more of a distraction for the patient themselves, and as examinations can remain normal, it allows the simple observation over time.  The possibility of a nerve compression at the wrist (median) or elbow (ulnar) on the left could be entertained, but with a benign exam, I would simply recommend observation to see circumstances that might make things worse and obviously if things were to progress.  We also talked about the possibility of  a cervical spine localization to all of this, again unlikely but still a possibility.  This would obviously not explain the symptoms across the face.  Signs and symptoms suggestive of cervical spine involvement were also reviewed.    She was told that most of this will likely be benign, but my office door is always available and open if she were to have progression or changes in symptoms that become more persistent and began to involve motor function especially.  There is no need for additional follow-up at this time.    Time: 40 minutes in total spent on patient care including pre-charting, record review, discussion with healthcare staff and documentation.  This includes face-to-face time for exam, review, discussion, as well as counseling and coordinating care.

## 2025-06-09 NOTE — ASSESSMENT & PLAN NOTE
Fortunately, her examination today is benign, the symptoms themselves have become more localized, the left upper extremity is now mostly the left hand, left lower extremity now mostly in the foot medially.  It is difficult to know what benign paresthesias might represent if anything pathologic.  They become more of a distraction for the patient themselves, and as examinations can remain normal, it allows the simple observation over time.  The possibility of a nerve compression at the wrist (median) or elbow (ulnar) on the left could be entertained, but with a benign exam, I would simply recommend observation to see circumstances that might make things worse and obviously if things were to progress.  We also talked about the possibility of a cervical spine localization to all of this, again unlikely but still a possibility.  This would obviously not explain the symptoms across the face.  Signs and symptoms suggestive of cervical spine involvement were also reviewed.    She was told that most of this will likely be benign, but my office door is always available and open if she were to have progression or changes in symptoms that become more persistent and began to involve motor function especially.  There is no need for additional follow-up at this time.

## 2025-07-07 ENCOUNTER — APPOINTMENT (OUTPATIENT)
Dept: URBAN - METROPOLITAN AREA CLINIC 35 | Facility: CLINIC | Age: 50
Setting detail: DERMATOLOGY
End: 2025-07-07

## 2025-07-07 DIAGNOSIS — L81.3 CAFÉ AU LAIT SPOTS: ICD-10-CM

## 2025-07-07 DIAGNOSIS — Z71.89 OTHER SPECIFIED COUNSELING: ICD-10-CM

## 2025-07-07 DIAGNOSIS — L98.8 OTHER SPECIFIED DISORDERS OF THE SKIN AND SUBCUTANEOUS TISSUE: ICD-10-CM

## 2025-07-07 DIAGNOSIS — D22 MELANOCYTIC NEVI: ICD-10-CM

## 2025-07-07 DIAGNOSIS — L82.1 OTHER SEBORRHEIC KERATOSIS: ICD-10-CM

## 2025-07-07 DIAGNOSIS — L81.4 OTHER MELANIN HYPERPIGMENTATION: ICD-10-CM

## 2025-07-07 DIAGNOSIS — D18.0 HEMANGIOMA: ICD-10-CM

## 2025-07-07 PROBLEM — D22.71 MELANOCYTIC NEVI OF RIGHT LOWER LIMB, INCLUDING HIP: Status: ACTIVE | Noted: 2025-07-07

## 2025-07-07 PROBLEM — D18.01 HEMANGIOMA OF SKIN AND SUBCUTANEOUS TISSUE: Status: ACTIVE | Noted: 2025-07-07

## 2025-07-07 PROBLEM — D22.5 MELANOCYTIC NEVI OF TRUNK: Status: ACTIVE | Noted: 2025-07-07

## 2025-07-07 PROCEDURE — ? COUNSELING

## 2025-07-07 PROCEDURE — ? SUNSCREEN RECOMMENDATIONS

## 2025-07-07 ASSESSMENT — LOCATION SIMPLE DESCRIPTION DERM
LOCATION SIMPLE: CHEST
LOCATION SIMPLE: LEFT LIP
LOCATION SIMPLE: ABDOMEN
LOCATION SIMPLE: RIGHT CALF
LOCATION SIMPLE: RIGHT UPPER BACK
LOCATION SIMPLE: LEFT FOREARM
LOCATION SIMPLE: LEFT THIGH

## 2025-07-07 ASSESSMENT — LOCATION DETAILED DESCRIPTION DERM
LOCATION DETAILED: RIGHT MEDIAL INFERIOR CHEST
LOCATION DETAILED: LEFT ANTERIOR PROXIMAL THIGH
LOCATION DETAILED: LEFT PROXIMAL DORSAL FOREARM
LOCATION DETAILED: RIGHT SUPERIOR MEDIAL UPPER BACK
LOCATION DETAILED: RIGHT SUPERIOR FLANK
LOCATION DETAILED: LEFT INFERIOR VERMILION LIP
LOCATION DETAILED: RIGHT PROXIMAL CALF

## 2025-07-07 ASSESSMENT — LOCATION ZONE DERM
LOCATION ZONE: ARM
LOCATION ZONE: TRUNK
LOCATION ZONE: LIP
LOCATION ZONE: LEG

## 2025-07-15 NOTE — ED NOTES
PIV established.  Labs drawn.  IVF started and medicated as ordered.  Pt is aware urine sample is needed.  Warm blanket provided.  Call light within reach.   Skin normal color for race, warm, dry and intact. No evidence of rash.